# Patient Record
Sex: FEMALE | Race: WHITE | NOT HISPANIC OR LATINO | Employment: STUDENT | URBAN - METROPOLITAN AREA
[De-identification: names, ages, dates, MRNs, and addresses within clinical notes are randomized per-mention and may not be internally consistent; named-entity substitution may affect disease eponyms.]

---

## 2019-10-23 ENCOUNTER — OFFICE VISIT (OUTPATIENT)
Dept: DERMATOLOGY | Facility: CLINIC | Age: 19
End: 2019-10-23
Payer: COMMERCIAL

## 2019-10-23 VITALS — SYSTOLIC BLOOD PRESSURE: 101 MMHG | DIASTOLIC BLOOD PRESSURE: 61 MMHG | HEART RATE: 74 BPM

## 2019-10-23 DIAGNOSIS — L70.0 ACNE VULGARIS: Primary | ICD-10-CM

## 2019-10-23 RX ORDER — CLINDAMYCIN PHOSPHATE 10 UG/ML
LOTION TOPICAL 2 TIMES DAILY
Qty: 60 ML | Refills: 3 | Status: SHIPPED | OUTPATIENT
Start: 2019-10-23 | End: 2020-07-13

## 2019-10-23 RX ORDER — TRETINOIN 0.25 MG/G
CREAM TOPICAL AT BEDTIME
Qty: 45 G | Refills: 3 | Status: SHIPPED | OUTPATIENT
Start: 2019-10-23 | End: 2020-07-13 | Stop reason: ALTCHOICE

## 2019-10-23 RX ORDER — CLINDAMYCIN PHOSPHATE 10 UG/ML
LOTION TOPICAL 2 TIMES DAILY
COMMUNITY
End: 2020-07-13 | Stop reason: ALTCHOICE

## 2019-10-23 ASSESSMENT — PAIN SCALES - GENERAL: PAINLEVEL: NO PAIN (0)

## 2019-10-23 NOTE — LETTER
Date:October 25, 2019      Patient was self referred, no letter generated. Do not send.        [facility] Health Information

## 2019-10-23 NOTE — LETTER
10/23/2019       RE: Stacey Rod  [address]  [place] [address]     Dear Colleague,    Thank you for referring your patient, Stacey Rod, to the [facility] DERMATOLOGY at [facility]. Please see a copy of my visit note below.    [facility] Dermatology Note      Dermatology Problem List:  1. Acne Vulgaris   -clindamycin 1% lotion, tretinoin 0.025% cream, sulfur/sulfacetamide wash    Encounter Date: Oct 23, 2019    CC:  Chief Complaint   Patient presents with     Derm Problem     Acne, Stacey is only using clindamycin lotion. Stacey needs to establish care.          History of Present Illness:  Ms. Stacey Rod is a 18 year old female who presents for evaluation of acne on the face, chest, and back. The patient reports that she has been dealing with acne since she was about [age]. She initially took minocycline and used tretinoin and benzoyl peroxide wash. This regimen provided relief for about a year, but the acne then became worse, and she discontinued the regimen because it was not providing relief. About a month ago, she started using clindamycin and sulfur/sulfacetamide topically. She states that these have been controlling her acne relatively well, though there has been some continued involvement on her face and upper chest. Of note, she found that her acne improved after she had her IUD placed. The patient voices no other concerns.      Past Medical History:   Patient Active Problem List   Diagnosis     Acne vulgaris     History reviewed. No pertinent past medical history.  History reviewed. No pertinent surgical history.    Social History:  Patient reports that she has quit smoking. She smoked 0.00 packs per day. She has never used smokeless tobacco.    Family History:  Family History   Problem Relation Age of Onset     Melanoma No family hx of      Skin Cancer No family hx of        Medications:  Current Outpatient  Medications   Medication Sig Dispense Refill     clindamycin (CLEOCIN T) 1 % external lotion Apply topically 2 times daily       levonorgestrel (EWA) 13.5 MG IUD 1 each by Intrauterine route once         No Known Allergies    Review of Systems:  -Constitutional: Otherwise feeling well today, in usual state of health.  -HEENT: Patient denies nonhealing oral sores.  -Skin: As above in HPI. No additional skin concerns.    Physical exam:  Vitals: /61   Pulse 74   GEN: This is a well developed, well-nourished female in no acute distress, in a pleasant mood.    SKIN: Acne exam, which includes the face, neck, upper central chest, shoulders, and upper central back was performed.  -Alas skin type: III   -There are superifical acneiform papules with intermixed open and closed comedones and superficial scarring on the face, upper chest, shoulders, and upper back.   -No other lesions of concern on areas examined.       Impression/Plan:  1. Acne Vulgaris: primarily comedonal on the face, upper chest, shoulders, and upper back    We discussed the natural etiology of the condition as well as the risks, benefits, and efficacy of treatment with topicals     Restart tretinoin 0.025% cream at bedtime    Continue: clindamycin 1% lotion as previously prescribed and sulfur sulfacetamide wash      Follow-up in 3 months, earlier for new or changing lesions.       Staff Involved:  Scribe/Staff    Scribe Disclosure  I, Dominic Najjar, am serving as a scribe to document services personally performed by Dr. Kush Fang MD, based on data collection and the provider's statements to me.    Provider Disclosure:   The documentation recorded by the scribe accurately reflects the services I personally performed and the decisions made by me.    Kush Fang MD   of Dermatology  Department of Dermatology  [facility]           Again, thank you for allowing me to participate in  the care of your patient.      Sincerely,    Kush Fang MD

## 2019-10-23 NOTE — PROGRESS NOTES
[facility] Dermatology Note      Dermatology Problem List:  1. Acne Vulgaris   -clindamycin 1% lotion, tretinoin 0.025% cream, sulfur/sulfacetamide wash    Encounter Date: Oct 23, 2019    CC:  Chief Complaint   Patient presents with     Derm Problem     Acne, Stacey is only using clindamycin lotion. Stacey needs to establish care.          History of Present Illness:  Ms. Stacey Rod is a 18 year old female who presents for evaluation of acne on the face, chest, and back. The patient reports that she has been dealing with acne since she was about [age]. She initially took minocycline and used tretinoin and benzoyl peroxide wash. This regimen provided relief for about a year, but the acne then became worse, and she discontinued the regimen because it was not providing relief. About a month ago, she started using clindamycin and sulfur/sulfacetamide topically. She states that these have been controlling her acne relatively well, though there has been some continued involvement on her face and upper chest. Of note, she found that her acne improved after she had her IUD placed. The patient voices no other concerns.      Past Medical History:   Patient Active Problem List   Diagnosis     Acne vulgaris     History reviewed. No pertinent past medical history.  History reviewed. No pertinent surgical history.    Social History:  Patient reports that she has quit smoking. She smoked 0.00 packs per day. She has never used smokeless tobacco.    Family History:  Family History   Problem Relation Age of Onset     Melanoma No family hx of      Skin Cancer No family hx of        Medications:  Current Outpatient Medications   Medication Sig Dispense Refill     clindamycin (CLEOCIN T) 1 % external lotion Apply topically 2 times daily       levonorgestrel (EWA) 13.5 MG IUD 1 each by Intrauterine route once         No Known Allergies    Review of Systems:  -Constitutional: Otherwise feeling well today,  in usual state of health.  -HEENT: Patient denies nonhealing oral sores.  -Skin: As above in HPI. No additional skin concerns.    Physical exam:  Vitals: /61   Pulse 74   GEN: This is a well developed, well-nourished female in no acute distress, in a pleasant mood.    SKIN: Acne exam, which includes the face, neck, upper central chest, shoulders, and upper central back was performed.  -Alas skin type: III   -There are superifical acneiform papules with intermixed open and closed comedones and superficial scarring on the face, upper chest, shoulders, and upper back.   -No other lesions of concern on areas examined.       Impression/Plan:  1. Acne Vulgaris: primarily comedonal on the face, upper chest, shoulders, and upper back    We discussed the natural etiology of the condition as well as the risks, benefits, and efficacy of treatment with topicals     Restart tretinoin 0.025% cream at bedtime    Continue: clindamycin 1% lotion as previously prescribed and sulfur sulfacetamide wash      Follow-up in 3 months, earlier for new or changing lesions.       Staff Involved:  Scribe/Staff    Scribe Disclosure  I, Dominic Najjar, am serving as a scribe to document services personally performed by Dr. Kush Fang MD, based on data collection and the provider's statements to me.    Provider Disclosure:   The documentation recorded by the scribe accurately reflects the services I personally performed and the decisions made by me.    Kush Fang MD   of Dermatology  Department of Dermatology  [facility]

## 2019-10-23 NOTE — NURSING NOTE
Dermatology Rooming Note    Stacey Rod's goals for this visit include:   Chief Complaint   Patient presents with     Derm Problem     Acne, Stacey is only using clindamycin lotion. Stacey needs to establish care.      Katy Capellan LPN

## 2020-03-06 ENCOUNTER — OFFICE VISIT (OUTPATIENT)
Dept: DERMATOLOGY | Facility: CLINIC | Age: 20
End: 2020-03-06
Payer: COMMERCIAL

## 2020-03-06 VITALS — DIASTOLIC BLOOD PRESSURE: 47 MMHG | SYSTOLIC BLOOD PRESSURE: 106 MMHG | HEART RATE: 49 BPM

## 2020-03-06 DIAGNOSIS — L30.2 AUTOECZEMATIZATION: ICD-10-CM

## 2020-03-06 DIAGNOSIS — B35.4 TINEA CORPORIS: ICD-10-CM

## 2020-03-06 DIAGNOSIS — L70.0 ACNE VULGARIS: Primary | ICD-10-CM

## 2020-03-06 RX ORDER — PRENATAL VIT 91/IRON/FOLIC/DHA 28-975-200
COMBINATION PACKAGE (EA) ORAL 2 TIMES DAILY
Qty: 42 G | Refills: 4 | Status: CANCELLED | OUTPATIENT
Start: 2020-03-06

## 2020-03-06 RX ORDER — TRIAMCINOLONE ACETONIDE 1 MG/G
CREAM TOPICAL
COMMUNITY
Start: 2020-03-02 | End: 2020-07-13 | Stop reason: ALTCHOICE

## 2020-03-06 RX ORDER — TRIAMCINOLONE ACETONIDE 1 MG/G
OINTMENT TOPICAL 2 TIMES DAILY
Qty: 80 G | Refills: 5 | Status: SHIPPED | OUTPATIENT
Start: 2020-03-06

## 2020-03-06 RX ORDER — TRETINOIN 0.5 MG/G
CREAM TOPICAL
Qty: 45 G | Refills: 5 | Status: SHIPPED | OUTPATIENT
Start: 2020-03-06

## 2020-03-06 ASSESSMENT — PAIN SCALES - GENERAL: PAINLEVEL: NO PAIN (0)

## 2020-03-06 NOTE — NURSING NOTE
"Dermatology Rooming Note    Stacey Rod's goals for this visit include:   Chief Complaint   Patient presents with     Derm Problem     Acne f/u - Stacey states, I broke out on Monday but its clearing up now. I am also getting a lot of red spots\"     Sharon Okeefe, EMT    "

## 2020-03-06 NOTE — LETTER
3/6/2020       RE: Stacey Rod  [address]  [place] [address]     Dear Colleague,    Thank you for referring your patient, Stacey Rod, to the [facility] DERMATOLOGY at [facility]. Please see a copy of my visit note below.    [facility] Dermatology Note    Dermatology Problem List:  1. Acne vulgaris  -Tretinoin 0.05% cream nightly  -Clindamycin lotion daily  2. 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 Macular pigmentary birthmark on the left shoulder    Encounter Date: Mar 6, 2020    CC: acne and rash    History of Present Illness:  Ms. Stacey Rod is a 19 year old female presenting for acne and rash  Acne has been improving overall, but still gets bumps every now and then  Tretinoin causes mild irritation but not severe  Patient states that she developed an acne flare about 1 week ago  Using sulfacetamide wash only on the body as it is drying  States that she fell on a bonfire a few weeks ago and got a skin eruption on the right buttock, then started getting red bumps and a ring-worm like eruption on the right anterior shin; was seen at [facility], KOH negative, was prescribed ketoconazole cream and shampoo which she used for a few weeks but the bumps never went away and started getting more bump elsewhere on the body that were itchy  Was told that she also had tinea versicolor versus a birthmark on the left shoulder, ketoconazole shampoo did not help with the appearance, not symptomatic at all  Started using cortisone cream on the eruption which has been helping with the itch    Past Medical, Social, Family History:   Patient Active Problem List   Diagnosis     Acne vulgaris     No past medical history on file.  No past surgical history on file.    Family History   Problem Relation Age of Onset     Melanoma No family hx of      Skin Cancer No family hx of        Social History  Studying environmental  sciences at [employer]  Patient  reports that she has quit smoking. She smoked 0.00 packs per day. She has never used smokeless tobacco.    Current Outpatient Medications   Medication Sig Dispense Refill     clindamycin (CLEOCIN T) 1 % external lotion Apply topically 2 times daily       clindamycin (CLEOCIN T) 1 % external lotion Apply topically 2 times daily 60 mL 3     levonorgestrel (EWA) 13.5 MG IUD 1 each by Intrauterine route once       tretinoin (RETIN-A) 0.025 % external cream Apply topically At Bedtime Pea-sized amount to whole face 45 g 3     tretinoin (RETIN-A) 0.05 % external cream Use nightly for acne as tolerated 45 g 5     triamcinolone (KENALOG) 0.1 % external cream APPLY TO AFFECTED AREA TWICE DAILY; MAX 2 WEEKS AT A TIME       triamcinolone (KENALOG) 0.1 % external ointment Apply topically 2 times daily 80 g 5        Allergies  No Known Allergies    Review of Systems:  Constitutional: Otherwise feeling well today, in usual state of health.  HEENT: Patient denies nonhealing oral sores.    Physical exam:  Vitals: /47 (BP Location: Right arm, Patient Position: Sitting, Cuff Size: Adult Regular)   Pulse (!) 49   General: In no acute distress, well-developed, well-nourished  Eyes: Conjunctivae clear  Pulmonary: Breathing comfortably in no distress  CV: Well-perfused, no cyanosis  Extremities: No deformity, no edema    Skin:   Alas II  -Face  -Neck  -Chest  -Abdomen  -Back  -Bilateral upper extremities  -Bilateral lower extremities  -Buttocks      -Rare superifical acneiform inflammatory papules and pustules with intermixed open and closed comedones on the face  -Annular pink scaly plaques on the right anterior shin  -Eczematous papules on the right buttock and left medial thigh  -Brown speckled reticulated patch on the left shoulder                      Impression/Plan:    Acne vulgaris, comedonal and inflammatory, mild  -Photos taken for documentation  -increase Tretinoin 0.05% cream every  other day; counseled on side effects of xerosis and irritation; recommended to start every other night, then increase to nightly as tolerated; follow with non-comedogenic moisturizer  -Clindamycin lotion to face, chest, back in the morning; can continue sulfur/sulfacetamide    2. Eczematous dermatitis with autoeczematization  Annular skin eruption with diffuse eczematous papules, KOH negative; suspect eczematous eruption, cannot rule out tinea corporis as it may have been partially treated   -continue triamcinolone 0.1% cream BID PRN    3. Macular pigmentary birthmark on the left shoulder  Likely more prominent with sun exposure  -Lesion benign nature, no treatment is indicated at this time, will monitor for any clinical changes    Follow-up in 3 months for acne    Faculty: Dr. Fang    Staff Involved:  Resident/Staff    Manasa Pinedo MD  Dermatology Resident  [facility]    Staff Physician Comments:   I saw and evaluated the patient with the resident and I edited the assessment and plan as documented in the note. I was present for the examination.    Kush Fang MD   of Dermatology  Department of Dermatology  [facility] [employer]

## 2020-03-06 NOTE — PATIENT INSTRUCTIONS
Use trimacinolone ointmnet on the rash twice daily until resolved  Tretinoin 0.05% cream nightly on the fade  Continue clindamycin lotion on the face  Sulfacetamide wash on the body  If the rash doesn't get better in 2 days, let us know

## 2020-03-06 NOTE — PROGRESS NOTES
[facility] Dermatology Note    Dermatology Problem List:  1. Acne vulgaris  -Tretinoin 0.05% cream nightly  -Clindamycin lotion daily  2. 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 Macular pigmentary birthmark on the left shoulder    Encounter Date: Mar 6, 2020    CC: acne and rash    History of Present Illness:  Ms. Stacey Rod is a 19 year old female presenting for acne and rash  Acne has been improving overall, but still gets bumps every now and then  Tretinoin causes mild irritation but not severe  Patient states that she developed an acne flare about 1 week ago  Using sulfacetamide wash only on the body as it is drying  States that she fell on a bonfire a few weeks ago and got a skin eruption on the right buttock, then started getting red bumps and a ring-worm like eruption on the right anterior shin; was seen at [facility], KOH negative, was prescribed ketoconazole cream and shampoo which she used for a few weeks but the bumps never went away and started getting more bump elsewhere on the body that were itchy  Was told that she also had tinea versicolor versus a birthmark on the left shoulder, ketoconazole shampoo did not help with the appearance, not symptomatic at all  Started using cortisone cream on the eruption which has been helping with the itch    Past Medical, Social, Family History:   Patient Active Problem List   Diagnosis     Acne vulgaris     No past medical history on file.  No past surgical history on file.    Family History   Problem Relation Age of Onset     Melanoma No family hx of      Skin Cancer No family hx of        Social History  Studying environmental sciences at [employer]  Patient  reports that she has quit smoking. She smoked 0.00 packs per day. She has never used smokeless tobacco.    Current Outpatient Medications   Medication Sig Dispense Refill     clindamycin (CLEOCIN T) 1 % external lotion Apply topically 2 times daily        clindamycin (CLEOCIN T) 1 % external lotion Apply topically 2 times daily 60 mL 3     levonorgestrel (EWA) 13.5 MG IUD 1 each by Intrauterine route once       tretinoin (RETIN-A) 0.025 % external cream Apply topically At Bedtime Pea-sized amount to whole face 45 g 3     tretinoin (RETIN-A) 0.05 % external cream Use nightly for acne as tolerated 45 g 5     triamcinolone (KENALOG) 0.1 % external cream APPLY TO AFFECTED AREA TWICE DAILY; MAX 2 WEEKS AT A TIME       triamcinolone (KENALOG) 0.1 % external ointment Apply topically 2 times daily 80 g 5        Allergies  No Known Allergies    Review of Systems:  Constitutional: Otherwise feeling well today, in usual state of health.  HEENT: Patient denies nonhealing oral sores.    Physical exam:  Vitals: /47 (BP Location: Right arm, Patient Position: Sitting, Cuff Size: Adult Regular)   Pulse (!) 49   General: In no acute distress, well-developed, well-nourished  Eyes: Conjunctivae clear  Pulmonary: Breathing comfortably in no distress  CV: Well-perfused, no cyanosis  Extremities: No deformity, no edema    Skin:   Alas II  -Face  -Neck  -Chest  -Abdomen  -Back  -Bilateral upper extremities  -Bilateral lower extremities  -Buttocks      -Rare superifical acneiform inflammatory papules and pustules with intermixed open and closed comedones on the face  -Annular pink scaly plaques on the right anterior shin  -Eczematous papules on the right buttock and left medial thigh  -Brown speckled reticulated patch on the left shoulder                      Impression/Plan:    Acne vulgaris, comedonal and inflammatory, mild  -Photos taken for documentation  -increase Tretinoin 0.05% cream every other day; counseled on side effects of xerosis and irritation; recommended to start every other night, then increase to nightly as tolerated; follow with non-comedogenic moisturizer  -Clindamycin lotion to face, chest, back in the morning; can continue sulfur/sulfacetamide    2.  Eczematous dermatitis with autoeczematization  Annular skin eruption with diffuse eczematous papules, KOH negative; suspect eczematous eruption, cannot rule out tinea corporis as it may have been partially treated   -continue triamcinolone 0.1% cream BID PRN    3. Macular pigmentary birthmark on the left shoulder  Likely more prominent with sun exposure  -Lesion benign nature, no treatment is indicated at this time, will monitor for any clinical changes    Follow-up in 3 months for acne    Faculty: Dr. Fang    Staff Involved:  Resident/Staff    Manasa Pinedo MD  Dermatology Resident  [facility]    Staff Physician Comments:   I saw and evaluated the patient with the resident and I edited the assessment and plan as documented in the note. I was present for the examination.    Kush Fang MD   of Dermatology  Department of Dermatology  [facility] [employer] [facility]

## 2020-05-20 ENCOUNTER — TRANSFERRED RECORDS (OUTPATIENT)
Dept: HEALTH INFORMATION MANAGEMENT | Facility: CLINIC | Age: 20
End: 2020-05-20

## 2020-07-13 ENCOUNTER — VIRTUAL VISIT (OUTPATIENT)
Dept: DERMATOLOGY | Facility: CLINIC | Age: 20
End: 2020-07-13
Payer: COMMERCIAL

## 2020-07-13 DIAGNOSIS — L70.0 ACNE VULGARIS: Primary | ICD-10-CM

## 2020-07-13 DIAGNOSIS — R23.8 DRY SCALP: ICD-10-CM

## 2020-07-13 RX ORDER — CLINDAMYCIN PHOSPHATE 10 UG/ML
LOTION TOPICAL DAILY
Qty: 60 ML | Refills: 4 | Status: SHIPPED | OUTPATIENT
Start: 2020-07-13

## 2020-07-13 RX ORDER — ESCITALOPRAM OXALATE 20 MG/1
20 TABLET ORAL DAILY
COMMUNITY

## 2020-07-13 ASSESSMENT — PAIN SCALES - GENERAL: PAINLEVEL: NO PAIN (0)

## 2020-07-13 NOTE — NURSING NOTE
Dermatology Rooming Note    Stacey Rod's goals for this visit include:   Chief Complaint   Patient presents with     Derm Problem     Acne vulgaris - Stacey states she has been stable. She had a few breakouts from not keeping up with her skin care routine from traveling     [facility]

## 2020-07-13 NOTE — LETTER
7/13/2020       RE: Stacey Rod  [address]  [place] [address]     Dear Colleague,    Thank you for referring your patient, Stacey Rod, to the [facility] DERMATOLOGY at [facility]. Please see a copy of my visit note below.    [facility]  [facility]      Impression and Recommendations (Patient Counseled on the Following):  1. Acne vulgaris: stable on current regimen; had recent flare with less consistent topical use, now using more consistently. Will increase frequency of tretinoin to nightly and continue rest of current regimen. We did discuss increasing to tretinoin 0.1% cream at bedtime or adding spironolactone but patient defers at this time.  -Tretinoin 0.05% cream at bedtime  -Clindamycin lotion QAM  -Sulfur/sulfacetamide wash daily to trunk    2. Seborrheic dermatitis vs scalp xerosis: start OTC zinc pyrithione shampoo. If refractory start coal tar shampoo. If remains refractory, consider fluocinolone oil at follow up      Follow-up:   2-3 months     Staff only:    Kush Fang MD   of Dermatology  Department of Dermatology  [facility]      _____________________________________________________________________________    Dermatology Problem List:  1. Acne vulgaris  -Tretinoin 0.05% cream nightly  -Clindamycin lotion daily  -Sulfur/sulfacetamide wash  2. 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 Macular pigmentary birthmark on the left shoulder  4. Mild seborrheic dermatitis vs xerosis of scalp: OTC zinc pyrithione shampoo    Encounter Date: Jul 13, 2020    CC:   Chief Complaint   Patient presents with     Derm Problem     Acne vulgaris - Stacey states she has been stable. She had a few breakouts from not keeping up with her skin care routine from traveling       History of Present Illness:  I have reviewed the teledermatology information and  the nursing intake corresponding to this issue. Stacey Rod is a 19 year old female who presents via teledermatology for acne.    Acne - overall going well   - a few breakouts on the face and chest   - back in routine the last week or so   - using tretinoin 0.05% cream at bedtime and clindamycin lotion in the morning   - benzoyl peroxide on the chest and back    Scalp has been very dry -     ROS:   General: no fevers, chills, or weight loss  Skin: as per HPI    Physical Examination:  General: Well-appearing, appropriately-developed individual. Alert and oriented in a pleasant mood.  Skin: Focused examination within the teledermatology video including face, chest was performed.   -erythematous papules and comedones on the jawline > forehead and chest    Past Medical History:   Patient Active Problem List   Diagnosis     Acne vulgaris     No past medical history on file.  No past surgical history on file.    Social History:  Patient reports that she has quit smoking. She smoked 0.00 packs per day. She has never used smokeless tobacco.    Family History:  Family History   Problem Relation Age of Onset     Melanoma No family hx of      Skin Cancer No family hx of        Medications:  Current Outpatient Medications   Medication     clindamycin (CLEOCIN T) 1 % external lotion     clindamycin (CLEOCIN T) 1 % external lotion     escitalopram (LEXAPRO) 20 MG tablet     levonorgestrel (EWA) 13.5 MG IUD     tretinoin (RETIN-A) 0.025 % external cream     tretinoin (RETIN-A) 0.05 % external cream     triamcinolone (KENALOG) 0.1 % external cream     triamcinolone (KENALOG) 0.1 % external ointment     No current facility-administered medications for this visit.           No Known Allergies      _____________________________________________________________________________    Teledermatology information:  - Location of patient: [place]  - Patient presented as: return  - Location of teledermatologist:  [facility] DERMATOLOGY  )  - Reason teledermatology is appropriate:  of National Emergency Regarding Coronavirus disease (COVID 19) Outbreak  - Image quality and interpretability: n/a  - Physician has received verbal consent for a Video/Photos Visit from the patient? Yes  - In-person dermatology visit recommendation: no  - Date of images: n/a  Following Video failure, the remainder of the visit was conducted by Telephone.  Video start time: 12:13  Video failure time: 12:20  Telephone start time: 12:21  Telephone end time: 12:28  - Date of report: 7/13/2020     Again, thank you for allowing me to participate in the care of your patient.      Sincerely,    Kush Fang MD

## 2020-07-13 NOTE — PROGRESS NOTES
[facility]      Impression and Recommendations (Patient Counseled on the Following):  1. Acne vulgaris: stable on current regimen; had recent flare with less consistent topical use, now using more consistently. Will increase frequency of tretinoin to nightly and continue rest of current regimen. We did discuss increasing to tretinoin 0.1% cream at bedtime or adding spironolactone but patient defers at this time.  -Tretinoin 0.05% cream at bedtime  -Clindamycin lotion QAM  -Sulfur/sulfacetamide wash daily to trunk    2. Seborrheic dermatitis vs scalp xerosis: start OTC zinc pyrithione shampoo. If refractory start coal tar shampoo. If remains refractory, consider fluocinolone oil at follow up      Follow-up:   2-3 months     Staff only:    Kush Fang MD   of Dermatology  Department of Dermatology  [facility]      _____________________________________________________________________________    Dermatology Problem List:  1. Acne vulgaris  -Tretinoin 0.05% cream nightly  -Clindamycin lotion daily  -Sulfur/sulfacetamide wash  2. 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 Macular pigmentary birthmark on the left shoulder  4. Mild seborrheic dermatitis vs xerosis of scalp: OTC zinc pyrithione shampoo    Encounter Date: Jul 13, 2020    CC:   Chief Complaint   Patient presents with     Derm Problem     Acne vulgaris - Stacey states she has been stable. She had a few breakouts from not keeping up with her skin care routine from traveling       History of Present Illness:  I have reviewed the teledermatology information and the nursing intake corresponding to this issue. Stacey Rod is a 19 year old female who presents via teledermatology for acne.    Acne - overall going well   - a few breakouts on the face and chest   - back in routine the last week or so   - using tretinoin 0.05% cream at  bedtime and clindamycin lotion in the morning   - benzoyl peroxide on the chest and back    Scalp has been very dry -     ROS:   General: no fevers, chills, or weight loss  Skin: as per HPI    Physical Examination:  General: Well-appearing, appropriately-developed individual. Alert and oriented in a pleasant mood.  Skin: Focused examination within the teledermatology video including face, chest was performed.   -erythematous papules and comedones on the jawline > forehead and chest    Past Medical History:   Patient Active Problem List   Diagnosis     Acne vulgaris     No past medical history on file.  No past surgical history on file.    Social History:  Patient reports that she has quit smoking. She smoked 0.00 packs per day. She has never used smokeless tobacco.    Family History:  Family History   Problem Relation Age of Onset     Melanoma No family hx of      Skin Cancer No family hx of        Medications:  Current Outpatient Medications   Medication     clindamycin (CLEOCIN T) 1 % external lotion     clindamycin (CLEOCIN T) 1 % external lotion     escitalopram (LEXAPRO) 20 MG tablet     levonorgestrel (EWA) 13.5 MG IUD     tretinoin (RETIN-A) 0.025 % external cream     tretinoin (RETIN-A) 0.05 % external cream     triamcinolone (KENALOG) 0.1 % external cream     triamcinolone (KENALOG) 0.1 % external ointment     No current facility-administered medications for this visit.           No Known Allergies      _____________________________________________________________________________    Teledermatology information:  - Location of patient: [place]  - Patient presented as: return  - Location of teledermatologist:  ([facility] DERMAT[facility] )  - Reason teledermatology is appropriate:  of National Emergency Regarding Coronavirus disease (COVID 19) Outbreak  - Image quality and interpretability: n/a  - Physician has received verbal consent for a Video/Photos Visit from the patient? Yes  - In-person dermatology visit  recommendation: no  - Date of images: n/a  Following Video failure, the remainder of the visit was conducted by Telephone.  Video start time: 12:13  Video failure time: 12:20  Telephone start time: 12:21  Telephone end time: 12:28  - Date of report: 7/13/2020

## 2020-07-13 NOTE — LETTER
Date:July 14, 2020      Patient was self referred, no letter generated. Do not send.        [facility] Physicians Health Information

## 2020-07-13 NOTE — PATIENT INSTRUCTIONS
[facility] Teledermatology Visit    Thank you for allowing us to participate in your care. Your findings, instructions and follow-up plan are as follows:    For dry scalp - start with zinc pyrithione shampoo (Head&Shoulders) 2-3 times per week. If this doesn't work, switch to     When should I call my doctor?    If you are worsening or not improving, please, contact us or seek urgent care as noted below.     Who should I call with questions (adults)?    [facility] (adult and pediatric): [phone]     [facility] (adult): [phone]    For urgent needs outside of business hours call the [facility] at [phone] and ask for the dermatology resident on call    If this is a medical emergency and you are unable to reach an ER, Call [phone]      Who should I call with questions (pediatric)?  [facility]- Pediatric Dermatology  Dr. Aicha Whittaker, Dr. Jonathan Vargas, Dr. Noemi Bernal, Viviana Sharma, LESTER Brown, Dr. Gin Puentes & Dr. Kush Patel  Non Urgent  Nurse Triage Line; [phone]- Rachael and Mara RN Care Coordinators   Evelin (/Complex ) [phone]    If you need a prescription refill, please contact your pharmacy. Refills are approved or denied by our Physicians during normal business hours, Monday through Fridays  Per office policy, refills will not be granted if you have not been seen within the past year (or sooner depending on your child's condition)    Scheduling Information:  Pediatric Appointment Scheduling and Call Center [phone]  Radiology Scheduling- [phone]  Sedation Unit Scheduling- [phone]  [place] Scheduling- General [phone]; Pediatric Dermatology [phone]  Main  Services: [phone]  Congolese: [phone]  Stateless: [phone]  Hmong/Koko/Jorge: [phone]  Preadmission Nursing Department  Fax Number: [phone] (Fax all pre-operative paperwork to this number)    For urgent matters arising during evenings, weekends, or holidays that cannot wait for normal business hours please call [phone] and ask for the Dermatology Resident On-Call to be paged.

## 2020-07-17 ENCOUNTER — MEDICAL CORRESPONDENCE (OUTPATIENT)
Dept: HEALTH INFORMATION MANAGEMENT | Facility: CLINIC | Age: 20
End: 2020-07-17

## 2020-08-06 NOTE — TELEPHONE ENCOUNTER
"FUTURE VISIT INFORMATION      FUTURE VISIT INFORMATION:    Date: 8/14/2020    Time: 10:30AM    Location: [facility]  REFERRAL INFORMATION:    Referring provider:  Dr. Siri Rivero    Referring providers clinic:  [facility] in [place], [place]    Reason for visit/diagnosis  Difficulty breathing through nose; hx sinus infections and surgery (last in 2014 or 2015, per pt). Pt preferred in person visit. Referred per Dr. Siri Rivero with [facility] in [place], [place]. Per pt, recs faxed to [phone] on 7/17.    RECORDS REQUESTED FROM:       Clinic name Comments Records Status Imaging Status   [facility] in [place]  [address]  [place] [address]  Ph. [phone]  Fx. [phone] 5/20/2020, 5/5/2020 note from Dr. Siri Rivero  8/20/2019 note from Ana BATISTA    9/10/15 CT Sinus \"Imaging was performed prior to FESS (BEMA w/ tiss, BETE, BES w/tiss, BEF w/tiss, NSR, BSMRT\"    *image tracking : [id number]   Sent to scan 8/6/2020 req 8/6/2020 - received 8/11/2020    ORAL SURGERY [place] [address] [place] [address]    [phone]   01/14/2019 note from Neftaly Evans MD   Care Everywhere     [facility]  [address] [place] [address] [phone]   11/24/2015 PANSINUSOTOMY with Alonso Yin MD   Care Everywhere                         8/6/2020 10:04AM sent a fax to [facility] for any imaging reports - amay   *receieved imaging report from [facility] and sent to scan, sent a fax to mail out images - Amay   8/11/2020 10:36AM images from [facility]. Received and sent to [facility] upload station. Per [facility] \"Imaging was performed prior to FESS (BEMA w/ Tiss, BETE, BES w/tiss, BEF w/Tiss, NSR, BSMRT\" written on the fax - amay   "

## 2020-08-14 ENCOUNTER — OFFICE VISIT (OUTPATIENT)
Dept: OTOLARYNGOLOGY | Facility: CLINIC | Age: 20
End: 2020-08-14
Payer: COMMERCIAL

## 2020-08-14 ENCOUNTER — TELEPHONE (OUTPATIENT)
Dept: OTOLARYNGOLOGY | Facility: CLINIC | Age: 20
End: 2020-08-14

## 2020-08-14 ENCOUNTER — PRE VISIT (OUTPATIENT)
Dept: OTOLARYNGOLOGY | Facility: CLINIC | Age: 20
End: 2020-08-14

## 2020-08-14 VITALS — WEIGHT: 125 LBS | OXYGEN SATURATION: 99 % | TEMPERATURE: 98.1 F | HEART RATE: 61 BPM

## 2020-08-14 DIAGNOSIS — J30.1 SEASONAL ALLERGIC RHINITIS DUE TO POLLEN: ICD-10-CM

## 2020-08-14 DIAGNOSIS — R06.89 DIFFICULTY BREATHING: Primary | ICD-10-CM

## 2020-08-14 DIAGNOSIS — J32.9 CHRONIC SINUSITIS, UNSPECIFIED LOCATION: ICD-10-CM

## 2020-08-14 DIAGNOSIS — J38.3 VOCAL CORD DYSFUNCTION: ICD-10-CM

## 2020-08-14 PROBLEM — J30.2 SEASONAL ALLERGIC RHINITIS: Status: ACTIVE | Noted: 2020-08-14

## 2020-08-14 RX ORDER — MISOPROSTOL 200 UG/1
TABLET ORAL
COMMUNITY
Start: 2020-08-13

## 2020-08-14 RX ORDER — DROSPIRENONE AND ETHINYL ESTRADIOL 0.03MG-3MG
KIT ORAL
COMMUNITY
Start: 2020-08-06

## 2020-08-14 RX ORDER — VALACYCLOVIR HYDROCHLORIDE 500 MG/1
TABLET, FILM COATED ORAL
COMMUNITY
Start: 2020-07-22

## 2020-08-14 RX ORDER — FLUTICASONE PROPIONATE 50 MCG
2 SPRAY, SUSPENSION (ML) NASAL 2 TIMES DAILY
Qty: 16 G | Refills: 11 | Status: SHIPPED | OUTPATIENT
Start: 2020-08-14

## 2020-08-14 ASSESSMENT — PAIN SCALES - GENERAL: PAINLEVEL: NO PAIN (0)

## 2020-08-14 NOTE — LETTER
8/14/2020       RE: Stacey Rod  [address]  [place] [address]     Dear Colleague,    Thank you for referring your patient, Stacey Rod, to the [facility] EAR NO[facility]AT at [facility] Please see a copy of my visit note below.      [facility]  New Patient Visit      Encounter date: August 14, 2020      Chief Complaint: chronic sinusitis; nasal congestion    History of Present Illness: Stacey Rod is a 19-year-old woman who I am seeing for difficulty breathing through the nose.  She does have a history of chronic sinusitis with nasal polyposis.  She underwent endoscopic sinus surgery in [facility] approximately 5 years ago.  This helped with symptoms of facial pain, pressure, and nasal drainage.  She complains today of persistent difficulty breathing through the nose and also through the mouth.  She has constant feelings of shortness of breath.  She does show some insight that she feels like there is some of this that may be related to anxiety and she is seeing a therapist for this.  She has seen speech therapy in the past for possible vocal cord dysfunction, but this was only a virtual visit.  She is interested in having referral here locally.  There is question of some component of seasonal allergic rhinitis which may be contributing to her nasal congestion.  She denies significant sneezing fits, itchy eyes, clear rhinorrhea.      Review of systems: A 14-point review of systems has been conducted and was negative for any notable symptoms, except as dictated in the history of present illness.     PMH: CRSwNP    PSH: ESS in 2015      Family History   Problem Relation Age of Onset     Melanoma No family hx of      Skin Cancer No family hx of         Social History     Socioeconomic History     Marital status: Single     Spouse name: Not on file     Number of children: Not on file     Years of education: Not on file     Highest  education level: Not on file   Occupational History     Not on file   Social Needs     Financial resource strain: Not on file     Food insecurity     Worry: Not on file     Inability: Not on file     Transportation needs     Medical: Not on file     Non-medical: Not on file   Tobacco Use     Smoking status: Former Smoker     Packs/day: 0.00     Smokeless tobacco: Never Used   Substance and Sexual Activity     Alcohol use: Not on file     Drug use: Not on file     Sexual activity: Not on file   Lifestyle     Physical activity     Days per week: Not on file     Minutes per session: Not on file     Stress: Not on file   Relationships     Social connections     Talks on phone: Not on file     Gets together: Not on file     Attends Restorationism service: Not on file     Active member of club or organization: Not on file     Attends meetings of clubs or organizations: Not on file     Relationship status: Not on file     Intimate partner violence     Fear of current or ex partner: Not on file     Emotionally abused: Not on file     Physically abused: Not on file     Forced sexual activity: Not on file   Other Topics Concern     Parent/sibling w/ CABG, MI or angioplasty before 65F 55M? Not Asked   Social History Narrative     Not on file        Physical Exam:  Vital signs: There were no vitals taken for this visit.   General Appearance: No acute distress, appropriate demeanor, conversant  Eyes: moist conjunctivae; EOMI; pupils symmetric; visual acuity grossly intact; no proptosis  Head: normocephalic; overall symmetric appearance without deformity  Face: overall symmetric without deformity; HB I/VI  Ears: Normal appearance of external ear; external meatus normal in appearance; TMs intact without perforation bilaterally;   Nose: No external deformity; septum midline; inferior turbinates without significant hypertrophy  Oral Cavity/oropharynx: Normal appearance of mucosa; tongue midline; no mass or lesions; oropharynx without  obvious mucosal abnormality  Neck: no palpable lymphadenopathy; thyroid without palpable nodules  Lungs: symmetric chest rise; no wheezing  CV: Good distal perfusion; normal heart rate  Extremities: No deformity  Neurologic Exam: Cranial nerves II-XII are grossly intact; no focal deficit      Procedure Note  Procedure performed: Rigid nasal endoscopy  Indication: To evaluate for sinonasal pathology not visualized on routine anterior rhinoscopy  Anesthesia: 4% topical lidocaine with 0.05% oxymetazoline  Description of procedure: A 30 degree, 3 mm rigid endoscope was inserted into bilateral nasal cavities and the nasal valves, nasal cavity, middle meatus, sphenoethmoid recess, and nasopharynx were thoroughly evaluated for evidence of obstruction, edema, purulence, polyps and/or mass/lesion.     Sd-Ajith Endoscopic Scoring System  Endoscopic observation Right Left   Polyps in middle meatus (0 = absent, 1 = restricted to middle meatus, 2 = Beyond middle meatus) 0 0   Discharge (0 = absent, 1 = thin and clear, 2 = thick, purulent) 0 0   Edema (0 = absent, 1 = mild-moderate, 2 = moderate-severe) 1 1   Crusting (0 = absent, 1 = mild-moderate, 2 = moderate-severe) 0 0   Scarring (0= absent, 1 = mild-moderate, 2 = moderate-severe) 0 0   Total 1 1     Findings  RT: Ethmoid, sphenoid and maxillary clear; there is mild edema of the frontal recess  LT: Ethmoid, sphenoid and maxillary clear; there is mild edema of the frontal recess    Nasopharynx is clear    The patient tolerated the procedure well without complication.     Laboratory Review:  n/a    Imaging Review:  I reviewed her preoperative CT sinus from 2015: There is moderate mucosal thickening of bilateral max ethmoid and frontal recess    Pathology Review:  n/a    Assessment/Medical Decision Making/Plan:  Vocal cord dysfunction  Chronic sinusitis with nasal polyposis  Nasal congestion    I do not believe that sinonasal inflammation is playing a large role in her  sensation of nasal obstruction.  There may be an allergic component, and she is interested in pursuing possible allergy that may be contributing to her symptoms of poor nasal breathing.  She is very insightful admits that many of her symptoms may be related to underlying anxiety and she is seeing a therapist for this.  She would like referral to speech therapy for vocal cord dysfunction, so we will refer her accordingly.  I advised her that if her sinus symptoms were to act up she should resume saline irrigations and Flonase as needed.  Today, however, her sinuses look quite good.      Daniel Galvez MD    [facility]  Rhinology  Endoscopic Skull Base Surgery  [facility]  Department of Otolaryngology - Head & Neck Surgery

## 2020-08-14 NOTE — NURSING NOTE
Chief Complaint   Patient presents with     Consult     Difficulty breathing through nose         Pulse 61, temperature 98.1  F (36.7  C), weight 56.7 kg (125 lb), SpO2 99 %.    Danni Resendiz, EMT

## 2020-08-14 NOTE — PATIENT INSTRUCTIONS
You were seen in the ENT clinic today with Dr. Galvez    Recommendations for you:    -Consult with our Speech Therapist for Vocal Cord Dysfunction    -Consult with an allergist to be evaluated for seasonal allergies   -Flonase Nasal Spray: 2 sprays into both nostrils twice daily    You may follow up with Dr. Galvez as needed    Please call our clinic for any questions, concerns, and/or worsening symptoms.      Clinic #[phone]       Option 1 for scheduling.    Thank you for allowing us to be apart of your care!    Lela CARTER, REYNALDOCC    If you need to reach me my direct line is: [phone]

## 2020-08-14 NOTE — PROGRESS NOTES
[facility]                   New Patient Visit      Encounter date: August 14, 2020      Chief Complaint: chronic sinusitis; nasal congestion    History of Present Illness: Stacey Rod is a 19-year-old woman who I am seeing for difficulty breathing through the nose.  She does have a history of chronic sinusitis with nasal polyposis.  She underwent endoscopic sinus surgery in [facility] approximately 5 years ago.  This helped with symptoms of facial pain, pressure, and nasal drainage.  She complains today of persistent difficulty breathing through the nose and also through the mouth.  She has constant feelings of shortness of breath.  She does show some insight that she feels like there is some of this that may be related to anxiety and she is seeing a therapist for this.  She has seen speech therapy in the past for possible vocal cord dysfunction, but this was only a virtual visit.  She is interested in having referral here locally.  There is question of some component of seasonal allergic rhinitis which may be contributing to her nasal congestion.  She denies significant sneezing fits, itchy eyes, clear rhinorrhea.      Review of systems: A 14-point review of systems has been conducted and was negative for any notable symptoms, except as dictated in the history of present illness.     PMH: CRSwNP    PSH: ESS in 2015      Family History   Problem Relation Age of Onset     Melanoma No family hx of      Skin Cancer No family hx of         Social History     Socioeconomic History     Marital status: Single     Spouse name: Not on file     Number of children: Not on file     Years of education: Not on file     Highest education level: Not on file   Occupational History     Not on file   Social Needs     Financial resource strain: Not on file     Food insecurity     Worry: Not on file     Inability: Not on file     Transportation needs     Medical: Not on file     Non-medical: Not  on file   Tobacco Use     Smoking status: Former Smoker     Packs/day: 0.00     Smokeless tobacco: Never Used   Substance and Sexual Activity     Alcohol use: Not on file     Drug use: Not on file     Sexual activity: Not on file   Lifestyle     Physical activity     Days per week: Not on file     Minutes per session: Not on file     Stress: Not on file   Relationships     Social connections     Talks on phone: Not on file     Gets together: Not on file     Attends Restoration service: Not on file     Active member of club or organization: Not on file     Attends meetings of clubs or organizations: Not on file     Relationship status: Not on file     Intimate partner violence     Fear of current or ex partner: Not on file     Emotionally abused: Not on file     Physically abused: Not on file     Forced sexual activity: Not on file   Other Topics Concern     Parent/sibling w/ CABG, MI or angioplasty before 65F 55M? Not Asked   Social History Narrative     Not on file        Physical Exam:  Vital signs: There were no vitals taken for this visit.   General Appearance: No acute distress, appropriate demeanor, conversant  Eyes: moist conjunctivae; EOMI; pupils symmetric; visual acuity grossly intact; no proptosis  Head: normocephalic; overall symmetric appearance without deformity  Face: overall symmetric without deformity; HB I/VI  Ears: Normal appearance of external ear; external meatus normal in appearance; TMs intact without perforation bilaterally;   Nose: No external deformity; septum midline; inferior turbinates without significant hypertrophy  Oral Cavity/oropharynx: Normal appearance of mucosa; tongue midline; no mass or lesions; oropharynx without obvious mucosal abnormality  Neck: no palpable lymphadenopathy; thyroid without palpable nodules  Lungs: symmetric chest rise; no wheezing  CV: Good distal perfusion; normal heart rate  Extremities: No deformity  Neurologic Exam: Cranial nerves II-XII are grossly  intact; no focal deficit      Procedure Note  Procedure performed: Rigid nasal endoscopy  Indication: To evaluate for sinonasal pathology not visualized on routine anterior rhinoscopy  Anesthesia: 4% topical lidocaine with 0.05% oxymetazoline  Description of procedure: A 30 degree, 3 mm rigid endoscope was inserted into bilateral nasal cavities and the nasal valves, nasal cavity, middle meatus, sphenoethmoid recess, and nasopharynx were thoroughly evaluated for evidence of obstruction, edema, purulence, polyps and/or mass/lesion.     Sd-Ajith Endoscopic Scoring System  Endoscopic observation Right Left   Polyps in middle meatus (0 = absent, 1 = restricted to middle meatus, 2 = Beyond middle meatus) 0 0   Discharge (0 = absent, 1 = thin and clear, 2 = thick, purulent) 0 0   Edema (0 = absent, 1 = mild-moderate, 2 = moderate-severe) 1 1   Crusting (0 = absent, 1 = mild-moderate, 2 = moderate-severe) 0 0   Scarring (0= absent, 1 = mild-moderate, 2 = moderate-severe) 0 0   Total 1 1     Findings  RT: Ethmoid, sphenoid and maxillary clear; there is mild edema of the frontal recess  LT: Ethmoid, sphenoid and maxillary clear; there is mild edema of the frontal recess    Nasopharynx is clear    The patient tolerated the procedure well without complication.     Laboratory Review:  n/a    Imaging Review:  I reviewed her preoperative CT sinus from 2015: There is moderate mucosal thickening of bilateral max ethmoid and frontal recess    Pathology Review:  n/a    Assessment/Medical Decision Making/Plan:  Vocal cord dysfunction  Chronic sinusitis with nasal polyposis  Nasal congestion    I do not believe that sinonasal inflammation is playing a large role in her sensation of nasal obstruction.  There may be an allergic component, and she is interested in pursuing possible allergy that may be contributing to her symptoms of poor nasal breathing.  She is very insightful admits that many of her symptoms may be related to  underlying anxiety and she is seeing a therapist for this.  She would like referral to speech therapy for vocal cord dysfunction, so we will refer her accordingly.  I advised her that if her sinus symptoms were to act up she should resume saline irrigations and Flonase as needed.  Today, however, her sinuses look quite good.      Daniel Galvez MD    [facility]  Rhinology  Endoscopic Skull Base Surgery  [facility]  Department of Otolaryngology - Head & Neck Surgery

## 2020-08-14 NOTE — TELEPHONE ENCOUNTER
LVM asking pt to call and set up New Video or Telephone Visit with next available or pateint choice SLP (Carlos Rajan, Janette Elizabeth, or Alicia Tian) for Vocal Cord Dysfunction per provider referral. Left call center number for scheduling.    Pt should also schedule new Allergy consult per provider referral. Allergy Clinic: [phone]

## 2020-08-17 NOTE — TELEPHONE ENCOUNTER
FUTURE VISIT INFORMATION      FUTURE VISIT INFORMATION:    Date: 9/8/20    Time: 11:00am    Location: [facility]  REFERRAL INFORMATION:    Referring provider:  Dr. Galvez    Referring providers clinic:  [facility]    Reason for visit/diagnosis  VCD    RECORDS REQUESTED FROM:       Clinic name Comments Records Status Imaging Status   [facility] Ov/referral 8/14/20 Epic

## 2020-09-08 ENCOUNTER — VIRTUAL VISIT (OUTPATIENT)
Dept: OTOLARYNGOLOGY | Facility: CLINIC | Age: 20
End: 2020-09-08
Attending: OTOLARYNGOLOGY
Payer: COMMERCIAL

## 2020-09-08 ENCOUNTER — PRE VISIT (OUTPATIENT)
Dept: OTOLARYNGOLOGY | Facility: CLINIC | Age: 20
End: 2020-09-08

## 2020-09-08 DIAGNOSIS — R49.0 DYSPHONIA: Primary | ICD-10-CM

## 2020-09-08 DIAGNOSIS — J38.3 VOCAL CORD DYSFUNCTION: ICD-10-CM

## 2020-09-08 NOTE — PROGRESS NOTES
Stacey Rod is a 19 year old female who is being evaluated via a billable video visit.      The patient has been notified and verbally consented to the following:     This video visit will be conducted between you and your provider.    Patient has opted to conduct today's video visit vs an in-person appointment, and is not able to attend due to possible exposure to COVID-19.      If during the course of the call the provider feels a video visit is not appropriate, you will not be charged for this service.    Call initiated at: 11:01  Type of Video Platform Used: [employer]  Location of provider: Residence  Location of patient: Residence    [facility]  Evaluation report    Clinician: Rafael Rajan M.M., M.A., CCC/SLP  Referring physician:  Dr. Galvez  Patient: Stacey Rod  Date of Visit: 9/8/2020    HISTORY  Chief complaint: Stacey Rod is a 19 year old woman presenting today for evaluation of breathing difficulties.    Salient history: She was seen by Dr. Galvez with complaints of difficulty breathing in through the nose and mouth.  She had undergone endoscopic sinus surgery at her home in [facility] approximately 5 years ago which helped with sensation of a facial pain, pressure, nasal drainage.  At her appointment with Dr. Galvez it was felt that sinus inflammation did not play a significant role in her breathing symptoms, but that allergy and potentially vocal cord dysfunction may.  She also acknowledged that symptoms of anxiety played a role in her symptoms and she was working with a therapist to help manage this.  She had been seen previously by speech therapy for vocal cord dysfunction, but it was a virtual visit and she was interested in pursuing care locally to the [facility].  With this in mind she was referred to our clinic for further evaluation and treatment as warranted.  She presents for this today.      Today she reports that she had bronchitis in [place] in the summer of 2019  while traveling independently.  She had difficulties accessing good health care and continued to have pretty difficulties the entire time she was gone.  These resurfaced in a new way upon returning to [place] with a constant sense of concern that she was unable to breathe and a sensation of something sticking or blocking in her throat.  She was seen by an ENT physician in [place] who performed nasal endoscopy and stated that she had some reflux changes, and that her symptoms could be considered vocal cord dysfunction.  She would referred to a local speech-language pathologist, and 4 sessions of therapy were completed more recently in June of this year.  It was also clear that there was a significant element of anxiety playing a role, and she began working with a therapist to manage this.  She feels that symptoms have improved so that it is no longer constant symptom however, symptoms not fully managed and she is working to establish local services to help with this both with regards to today's appointment as well as with finding a therapist to help continue to work with her anxiety.    CURRENT SYMPTOMS INCLUDE  VOICE    No correlation to voicing    COUGH/THROAT CLEARING    Will cough and clear her throat when she feels the sensation of something in her throat     SWALLOWING    Eating and drinking can lead her to a residual feeling of something in her throat    No food getting stuck    No aspiration symptons    No PNA    BREATHING    No breathing noise    Difficult to breathe both in and out    Hard to take a full breath    Harder to breathe through her nose    Patient denies significant dysphagia, dysphonia and pain.     OTHER PERTINENT HISTORY    Otherwise unknown.  Please also refer to Dr. Galvez's dictation.     OBJECTIVE  PATIENT REPORTED MEASURES    Patient was unable to complete her intake forms prior to today's appointment.  They will be sent to her independently and added when they are received.    PERCEPTUAL  EVALUATION (CPT 72949)  POSTURE / TENSION:     neck    BREATHING:     appears within normal limits and adequate     shallow    phonation is not coordinated with respiration    LARYNGEAL PALPATION:     No significant discomfort in the thyrohyoid space, though patient acknowledged this was near the location of the globus sensation    Sensation was even more dramatic along the thyroid lamina, and ultimately was localized more specifically to the proximal trachea/cricothyroid space    VOICE:    Roughness: Mild Intermittent (glottal blanton)    Breathiness: Minimal    Strain: Mild Intermittent    MPT - 15    Loudness    Conversational speech:  WNL    Pitch:    Conversational speech:      Resonance:    Conversational speech:  laryngeal pharyngeal resonance and consistent use of glottal blanton beyond social norms    Singing vs. Speech: Decreased roughness noted in singing versus running speech    CAPE-V Overall Severity:  13/100    COUGH/THROAT CLEARING:    Not observed    THERAPY PROBES: Improvement was elicited with coordination of respiration and phonation and use of rescue breathing strategies    ASSESSMENT / PLAN  IMPRESSIONS: Stacey Rod is presenting today with R49.0 (Dysphonia) and J38.3 (Vocal dysfunction) in the context of nonoptimal laryngeal and respiratory mechanics, significant stress and anxiety in the wake of her illness abroad, and possible gastroesophageal reflux.  Today's evaluation demonstrates upper thoracic predominant shallow breathing pattern, that does not seem to have excessive muscular recruitment; however, seems overly controlled and measured corresponding to an increase in effort with breathing.  Inspiratory stridor was not noted, nor is it reported with patient's symptoms.  Perceptual quality of voicing during evaluation point towards glottal regulation of airflow which contribute to her overall symptoms.    STIMULABILITY: results of therapy probes during perceptual and laryngeal  evaluation demonstrate improvement with coordination of respiration and phonation and use of rescue breathing strategies    RECOMMENDATIONS:     A course of speech therapy is recommended to help reduce Breathing difficulties with nonoptimal laryngeal and respiratory mechanics.    She demonstrates a Good prognosis for improvement given adherence to therapeutic recommendations.     Positive indicators: positive response to therapy probes diagnosis is known to respond to treatment previously positive response to behavioral therapy    Negative indicators: None    DURATION / FREQUENCY: 3 biweekly and 2 monthly one-hour sessions    GOALS:  Patient goal:   1. To understand the problem and fix it as much as possible  2. To breathe normally and comfortably in all situations    Short-term goal(s): Within the first 4 sessions, Ms. Rod:  1. will demonstrate assigned laryngeal massage techniques with 80% accuracy or better with no clinician support  2. will be able to independently list key factors in maintenance of good vocal hygiene with 80% accuracy, and report on their use outside the therapy room.  3. will utilize silent inhalation with good low-respiratory engagement 75% of the time during therapy tasks with minimal clinician support  4. will demonstrate semi-occluded vocal tract (SOVT) exercises with at least 80% accuracy with no clinician support  5. Will demonstrate retrograde strategies with 100% accuracy and no clinician support    Long-term goal(s): In 6 months, Ms. Rod will:  1. Report a week of typical activities, in which Breathing difficulties does not exceed a level of 2 out of 10, 80% of the time    This treatment plan was developed with the patient who agreed with the recommendations.    _______________________________________________________________________  THERAPY NOTE (CPT 60554)  Date of Service: 9/8/2020    SUBJECTIVE / OBJECTIVE:  Please refer to my evaluation report from today's encounter for  "full details regarding subjective data, patient reported measures, and diagnostic findings.    THERAPEUTIC ACTIVITIES  Counseling and Education    Asked many questions about the nature of her symptoms, and I answered all of these thoroughly.    Instructed concepts and techniques for optimal vocal hygiene including:    Systemic hydration, including strategies for increasing daily water intake    Topical hydration - Gargling, saline nasal irrigation, humidification, steam, guaifenesin    Environmental barriers to healthy voicing - noise, inhaled irritants, room acoustics    Management of laryngopharyngeal reflux disease (LPRD)    Dietary alterations which may reduce liklihood of reflux events    Avoiding eating or drinking within 2-3 hours of bedtime    Raising the headboard of the bed by 3-4 inches    Avoiding eating and drinking immediately prior to rigorous activity    Proper timing and use of acid reflux medication discussed in general context with recommendation of follow-up with pharmacist for detailed instructions    Exercises to promote optimal respiratory mechanics    I provided explanation of the anatomy and physiology of respiration for speech and singing; she found this to be helpful    She demonstrated excessive upper thoracic engagement during inhalation    Rescue breathing strategies using oral configurations to promote improved vocal fold abduction were instructed    Patient reported pursed lip inhalation was most beneficial    Patient was able to demonstrate use of these techniques in combination with low respiratory engagement with good accuracy and minimal clinician support    A plan for when and how to implement these strategies was developed, and the patient was encouraged to practice the techniques independent of distress two times daily to habituate their use.    Balanced pattern of inhalation versus exhalation to avoid breath stacking was also targeted with awareness of recoil force \"release\" " with adequate exhalation      Concepts of an optimal regimen for practice were instructed.  o She should use an interval schedule of practice, with brief periods of practice frequently throughout each day  o [place] concepts of volitional practice to facilitate motor learning.    I provided an audio recording and handouts of today's therapeutic activities to facilitate practice.    ASSESSMENT/PLAN  PROGRESS TOWARD LONG TERM GOALS:   Minimal at this point, as this is first session, but good learning today    IMPRESSIONS: R49.0 (Dysphonia) and J38.3 (Vocal dysfunction) in the context of nonoptimal laryngeal and respiratory mechanics, significant stress and anxiety in the wake of her illness abroad, and possible gastroesophageal reflux. Ms. Rod reported significantly improved ease with rescue breathing strategies and focus on balanced patterns of inhalation and exhalation.  Also be timing of presentation of her symptoms sounds as if it may be exacerbated abdomen 1 by gastroesophageal reflux and basic reflux precautions were discussed and presented today.    PLAN: I will see Ms. Rod in approximately 3 weeks, at which time we will continue to advance optimization of laryngeal respiratory mechanics.     TOTAL SERVICE TIME: 60 minutes  EVALUATION OF VOICE AND RESONANCE (62772)  TREATMENT (55569)  NO CHARGE FACILITY FEE (77671)    Rafael Rajan M.M., M.A., CCC-SLP  Speech-Language Pathologist  Certificate of Vocology  [phone]    *this report was created in part through the use of computerized dictation software, and though reviewed following completion, some typographic errors may persist.  If there is confusion regarding any of this notes contents, please contact me for clarification.*

## 2020-09-08 NOTE — PATIENT INSTRUCTIONS
"Hi Neva,    I've attached breathing exercises from today as well as a questionnaire I would love your answers to when you have a chance to fill it out.  Also with the idea of estabilishing a baseline I'd love your answer to these two questions as well:    Overall how would you rate your breathing if 10 is best and 0 is worst?  How much does your breathing bother you? not at all => a little bit => somewhat => quite a bit => very much    For now just focus on watching out for reflux, use gargling and humidificaiton to take care of your throat.  When you have an episode focus on pursed lip breathing (see rescue breathing handouts) and exhaling enough to feel the \"release\" as you start to inhale. If you don't get called about scheduling reach out to [phone] and you can make the appointments at your liesure.    -TAVIA Zamora?.TAVIA. (voice), MDustyA. CCC-SLP    [facility]    pronouns: He / Him / His  [phone]  [email or web address]   [email or web address]  "

## 2020-09-08 NOTE — LETTER
9/8/2020       RE: Stacey Rod  [id number] [facility] [address]     Dear Colleague,    Thank you for referring your patient, Stacey Rod, to the [facility]. Please see a copy of my visit note below.    Stacey Rod is a 19 year old female who is being evaluated via a billable video visit.      The patient has been notified and verbally consented to the following:     This video visit will be conducted between you and your provider.    Patient has opted to conduct today's video visit vs an in-person appointment, and is not able to attend due to possible exposure to COVID-19.      If during the course of the call the provider feels a video visit is not appropriate, you will not be charged for this service.    Call initiated at: 11:01  Type of Video Platform Used: Advanced Image Enhancement  Location of provider: Residence  Location of patient: [facility]  Evaluation report    Clinician: Rafael Rajan M.M., M.A., CCC/SLP  Referring physician:  Dr. Galvez  Patient: Stacey Rod  Date of Visit: 9/8/2020    HISTORY  Chief complaint: Stacey Rod is a 19 year old woman presenting today for evaluation of breathing difficulties.    Salient history: She was seen by Dr. Galvez with complaints of difficulty breathing in through the nose and mouth.  She had undergone endoscopic sinus surgery at her home in [facility] approximately 5 years ago which helped with sensation of a facial pain, pressure, nasal drainage.  At her appointment with Dr. Galvez it was felt that sinus inflammation did not play a significant role in her breathing symptoms, but that allergy and potentially vocal cord dysfunction may.  She also acknowledged that symptoms of anxiety played a role in her symptoms and she was working with a therapist to help manage this.  She had been seen previously by speech therapy for vocal cord dysfunction, but it was a virtual visit  and she was interested in pursuing care locally to the [facility].  With this in mind she was referred to our clinic for further evaluation and treatment as warranted.  She presents for this today.      Today she reports that she had bronchitis in [place] in the summer of 2019 while traveling independently.  She had difficulties accessing good health care and continued to have pretty difficulties the entire time she was gone.  These resurfaced in a new way upon returning to [place] with a constant sense of concern that she was unable to breathe and a sensation of something sticking or blocking in her throat.  She was seen by an ENT physician in [place] who performed nasal endoscopy and stated that she had some reflux changes, and that her symptoms could be considered vocal cord dysfunction.  She would referred to a local speech-language pathologist, and 4 sessions of therapy were completed more recently in June of this year.  It was also clear that there was a significant element of anxiety playing a role, and she began working with a therapist to manage this.  She feels that symptoms have improved so that it is no longer constant symptom however, symptoms not fully managed and she is working to establish local services to help with this both with regards to today's appointment as well as with finding a therapist to help continue to work with her anxiety.    CURRENT SYMPTOMS INCLUDE  VOICE    No correlation to voicing    COUGH/THROAT CLEARING    Will cough and clear her throat when she feels the sensation of something in her throat     SWALLOWING    Eating and drinking can lead her to a residual feeling of something in her throat    No food getting stuck    No aspiration symptons    No PNA    BREATHING    No breathing noise    Difficult to breathe both in and out    Hard to take a full breath    Harder to breathe through her nose    Patient denies significant dysphagia, dysphonia and pain.     OTHER PERTINENT  HISTORY    Otherwise unknown.  Please also refer to Dr. Galvez's dictation.     OBJECTIVE  PATIENT REPORTED MEASURES    Patient was unable to complete her intake forms prior to today's appointment.  They will be sent to her independently and added when they are received.    PERCEPTUAL EVALUATION (CPT 62771)  POSTURE / TENSION:     neck    BREATHING:     appears within normal limits and adequate     shallow    phonation is not coordinated with respiration    LARYNGEAL PALPATION:     No significant discomfort in the thyrohyoid space, though patient acknowledged this was near the location of the globus sensation    Sensation was even more dramatic along the thyroid lamina, and ultimately was localized more specifically to the proximal trachea/cricothyroid space    VOICE:    Roughness: Mild Intermittent (glottal blanton)    Breathiness: Minimal    Strain: Mild Intermittent    MPT - 15    Loudness    Conversational speech:  WNL    Pitch:    Conversational speech:      Resonance:    Conversational speech:  laryngeal pharyngeal resonance and consistent use of glottal blanton beyond social norms    Singing vs. Speech: Decreased roughness noted in singing versus running speech    CAPE-V Overall Severity:  13/100    COUGH/THROAT CLEARING:    Not observed    THERAPY PROBES: Improvement was elicited with coordination of respiration and phonation and use of rescue breathing strategies    ASSESSMENT / PLAN  IMPRESSIONS: Stacey Rod is presenting today with R49.0 (Dysphonia) and J38.3 (Vocal dysfunction) in the context of nonoptimal laryngeal and respiratory mechanics, significant stress and anxiety in the wake of her illness abroad, and possible gastroesophageal reflux.  Today's evaluation demonstrates upper thoracic predominant shallow breathing pattern, that does not seem to have excessive muscular recruitment; however, seems overly controlled and measured corresponding to an increase in effort with breathing.  Inspiratory  stridor was not noted, nor is it reported with patient's symptoms.  Perceptual quality of voicing during evaluation point towards glottal regulation of airflow which contribute to her overall symptoms.    STIMULABILITY: results of therapy probes during perceptual and laryngeal evaluation demonstrate improvement with coordination of respiration and phonation and use of rescue breathing strategies    RECOMMENDATIONS:     A course of speech therapy is recommended to help reduce Breathing difficulties with nonoptimal laryngeal and respiratory mechanics.    She demonstrates a Good prognosis for improvement given adherence to therapeutic recommendations.     Positive indicators: positive response to therapy probes diagnosis is known to respond to treatment previously positive response to behavioral therapy    Negative indicators: None    DURATION / FREQUENCY: 3 biweekly and 2 monthly one-hour sessions    GOALS:  Patient goal:   1. To understand the problem and fix it as much as possible  2. To breathe normally and comfortably in all situations    Short-term goal(s): Within the first 4 sessions, Ms. Rod:  1. will demonstrate assigned laryngeal massage techniques with 80% accuracy or better with no clinician support  2. will be able to independently list key factors in maintenance of good vocal hygiene with 80% accuracy, and report on their use outside the therapy room.  3. will utilize silent inhalation with good low-respiratory engagement 75% of the time during therapy tasks with minimal clinician support  4. will demonstrate semi-occluded vocal tract (SOVT) exercises with at least 80% accuracy with no clinician support  5. Will demonstrate retrograde strategies with 100% accuracy and no clinician support    Long-term goal(s): In 6 months, Ms. Rod will:  1. Report a week of typical activities, in which Breathing difficulties does not exceed a level of 2 out of 10, 80% of the time    This treatment plan was developed  with the patient who agreed with the recommendations.    _______________________________________________________________________  THERAPY NOTE (CPT 12181)  Date of Service: 9/8/2020    SUBJECTIVE / OBJECTIVE:  Please refer to my evaluation report from today's encounter for full details regarding subjective data, patient reported measures, and diagnostic findings.    THERAPEUTIC ACTIVITIES  Counseling and Education    Asked many questions about the nature of her symptoms, and I answered all of these thoroughly.    Instructed concepts and techniques for optimal vocal hygiene including:    Systemic hydration, including strategies for increasing daily water intake    Topical hydration - Gargling, saline nasal irrigation, humidification, steam, guaifenesin    Environmental barriers to healthy voicing - noise, inhaled irritants, room acoustics    Management of laryngopharyngeal reflux disease (LPRD)    Dietary alterations which may reduce liklihood of reflux events    Avoiding eating or drinking within 2-3 hours of bedtime    Raising the headboard of the bed by 3-4 inches    Avoiding eating and drinking immediately prior to rigorous activity    Proper timing and use of acid reflux medication discussed in general context with recommendation of follow-up with pharmacist for detailed instructions    Exercises to promote optimal respiratory mechanics    I provided explanation of the anatomy and physiology of respiration for speech and singing; she found this to be helpful    She demonstrated excessive upper thoracic engagement during inhalation    Rescue breathing strategies using oral configurations to promote improved vocal fold abduction were instructed    Patient reported pursed lip inhalation was most beneficial    Patient was able to demonstrate use of these techniques in combination with low respiratory engagement with good accuracy and minimal clinician support    A plan for when and how to implement these strategies  "was developed, and the patient was encouraged to practice the techniques independent of distress two times daily to habituate their use.    Balanced pattern of inhalation versus exhalation to avoid breath stacking was also targeted with awareness of recoil force \"release\" with adequate exhalation      Concepts of an optimal regimen for practice were instructed.  o She should use an interval schedule of practice, with brief periods of practice frequently throughout each day  o [facility] concepts of volitional practice to facilitate motor learning.    I provided an audio recording and handouts of today's therapeutic activities to facilitate practice.    ASSESSMENT/PLAN  PROGRESS TOWARD LONG TERM GOALS:   Minimal at this point, as this is first session, but good learning today    IMPRESSIONS: R49.0 (Dysphonia) and J38.3 (Vocal dysfunction) in the context of nonoptimal laryngeal and respiratory mechanics, significant stress and anxiety in the wake of her illness abroad, and possible gastroesophageal reflux. Ms. Rod reported significantly improved ease with rescue breathing strategies and focus on balanced patterns of inhalation and exhalation.  Also be timing of presentation of her symptoms sounds as if it may be exacerbated abdomen 1 by gastroesophageal reflux and basic reflux precautions were discussed and presented today.    PLAN: I will see Ms. Rdo in approximately 3 weeks, at which time we will continue to advance optimization of laryngeal respiratory mechanics.     TOTAL SERVICE TIME: 60 minutes  EVALUATION OF VOICE AND RESONANCE (40503)  TREATMENT (01660)  NO CHARGE FACILITY FEE (81503)    Rafael Rajan M.M., M.A., CCC-SLP  Speech-Language Pathologist  Certificate of Vocology  [phone]    *this report was created in part through the use of computerized dictation software, and though reviewed following completion, some typographic errors may persist.  If there is confusion regarding any of this notes " contents, please contact me for clarification.*      Again, thank you for allowing me to participate in the care of your patient.      Sincerely,    Carlos Rajan, SLP

## 2020-10-01 ENCOUNTER — VIRTUAL VISIT (OUTPATIENT)
Dept: OTOLARYNGOLOGY | Facility: CLINIC | Age: 20
End: 2020-10-01
Payer: COMMERCIAL

## 2020-10-01 DIAGNOSIS — R06.89 DIFFICULTY BREATHING: ICD-10-CM

## 2020-10-01 DIAGNOSIS — R49.0 DYSPHONIA: ICD-10-CM

## 2020-10-01 DIAGNOSIS — J38.3 VOCAL CORD DYSFUNCTION: Primary | ICD-10-CM

## 2020-10-01 PROCEDURE — 92507 TX SP LANG VOICE COMM INDIV: CPT | Mod: GN | Performed by: SPEECH-LANGUAGE PATHOLOGIST

## 2020-10-01 NOTE — PROGRESS NOTES
"Stacey Rod is a 19 year old female who is being evaluated via a billable video visit.      The patient has been notified and verbally consented to the following:     This video visit will be conducted between you and your provider.    Patient has opted to conduct today's video visit vs an in-person appointment, and is not able to attend due to possible exposure to COVID-19.      If during the course of the call the provider feels a video visit is not appropriate, you will not be charged for this service.    Call initiated at: 10:00  Type of Video Platform Used: Rising  Location of provider: Residence  Location of patient: Residence    [facility]  THERAPY NOTE (CPT 59287)    Patient: Stacey Rod  Date of Service: 10/1/2020  Referring physician: Dr. Galvez  Impressions from most recent evaluation:  \"Stacey Rod is presenting today with R49.0 (Dysphonia) and J38.3 (Vocal dysfunction) in the context of nonoptimal laryngeal and respiratory mechanics, significant stress and anxiety in the wake of her illness abroad, and possible gastroesophageal reflux.  Today's evaluation demonstrates upper thoracic predominant shallow breathing pattern, that does not seem to have excessive muscular recruitment; however, seems overly controlled and measured corresponding to an increase in effort with breathing.  Inspiratory stridor was not noted, nor is it reported with patient's symptoms.  Perceptual quality of voicing during evaluation point towards glottal regulation of airflow which contribute to her overall symptoms.\"    SUBJECTIVE:  Since the patient's last session, they report the following:     Overall symptoms are better    Hasn't been stressing about her breathing in the past week and a half    Maybe two very minimal episodes    Breathing out sufficiently to feel recoil has been helpful    Avoiding eating and drinking before bed    OBJECTIVE:  PATIENT REPORTED MEASURES:  Patient Supplied Answers To SLP " "QOL Questionnaire  Therapy Quality of Life 10/1/2020   Since my l ast session, I used the speech therapy exercises and strategies as recommended by my speech pathologist. Agree   I feel that using my therapy techniques has become a habit Neither agree nor disagree   I feel confident in my ability to manage my current and future symptoms. Agree   Since my last session I feel my symptoms have --------. Improved   Overall, since starting therapy I feel my symptoms are --------. Better   Overall, how much better? A good deal better     THERAPEUTIC ACTIVITIES    Counseling and Education:    Asked many questions about the nature of their symptoms, and I answered all of these thoroughly.    Exercises to promote reduced perilaryngeal muscle tension    Four way neck stretch instructed    Modifications for additional extension instructed    Base of tongue stretch instructed    Proper form for each stretch was emphasized, including:    Maintenance of posture for 10 breaths (~20-30 seconds)    Awareness of tension on inhalation with volitional relaxation into the stretch on exhalation    Avoidance of \"forcing\" a posture, only progressing far enough to feel the stretch    She noted increased sense of muscular relaxation following completion of the stretches    Manual Laryngeal massage was performed in combination with gentle forward resonant sounds    Significant tenderness of the thyrohyoid space (right predominate)     Thyrohyoid space, and base of tongue were targeted with gentle circular massage    Gentle sternocleidomastoid massage was also performed.    Patient was trained to focus on intentional relaxation of jaw and tongue in addition to area of massage during these maneuvers.    Self-massage was instructed and patient was able to demonstrate this with acceptable accuracy  Exercises to promote optimal respiratory mechanics    Patient demonstrated previously assigned exercises including rescue breathing strategies with " good accuracy    These techniques were put into practice with a focus on release of tension and improve somatosensory awareness via the principles of a body scan meditation    Patient was able to demonstrate this with good accuracy and reported improved relaxation following    She was encouraged to use these techniques along with the above perilaryngeal stretches and massage to bolster the effect of those techniques      A regimen for home practice was instructed.    I provided an audio recording and handouts of today's therapeutic activities to facilitate practice.    ASSESSMENT/PLAN  PROGRESS TOWARD LONG TERM GOALS:   Good progress; please see report above for objective measures    IMPRESSIONS: R49.0 (Dysphonia) and J38.3 (Vocal dysfunction) in the context of nonoptimal laryngeal and respiratory mechanics, significant stress and anxiety in the wake of her illness abroad, and possible gastroesophageal reflux. Stacey is doing very well, reporting great reduction in the severity and frequency of her breathing episodes.  She feels that maintaining adequate exhalation to avoid breath stacking has been very helpful.  Today introduced manual therapy techniques as well as somatosensory building techniques.  Good accuracy with the use of these techniques was seen.    PLAN: I will see Stacey in approximately 3 weeks, if her symptoms have continued to stay resolved that appointment may be canceled at which point we will follow-up in 1 month to gauge maintenance of gains.    For practice goals see AVS.       TOTAL SERVICE TIME: 60 minutes  TREATMENT (68644): 60 minutes  NO CHARGE FACILITY FEE (99885)    Rafael Rajan M.M., M.A., CCC-SLP  Speech-Language Pathologist  Certificate of Vocology  [phone]    *this report was created in part through the use of computerized dictation software, and though reviewed following completion, some typographic errors may persist.  If there is confusion regarding any of this notes  contents, please contact me for clarification.*

## 2020-10-01 NOTE — LETTER
"10/1/2020       RE: Stacey Rod  [id number] [facility] [address]     Dear Colleague,    Thank you for referring your patient, Stacey Rod, to the [facility]ICE [facility] [place] [facility]. Please see a copy of my visit note below.    Stacey Rod is a 19 year old female who is being evaluated via a billable video visit.      The patient has been notified and verbally consented to the following:     This video visit will be conducted between you and your provider.    Patient has opted to conduct today's video visit vs an in-person appointment, and is not able to attend due to possible exposure to COVID-19.      If during the course of the call the provider feels a video visit is not appropriate, you will not be charged for this service.    Call initiated at: 10:00  Type of Video Platform Used: [employer]  Location of provider: Residence  Location of patient: [facility]  THERAPY NOTE (CPT 56469)    Patient: Stacey Rod  Date of Service: 10/1/2020  Referring physician: Dr. Galvez  Impressions from most recent evaluation:  \"Stacey Rod is presenting today with R49.0 (Dysphonia) and J38.3 (Vocal dysfunction) in the context of nonoptimal laryngeal and respiratory mechanics, significant stress and anxiety in the wake of her illness abroad, and possible gastroesophageal reflux.  Today's evaluation demonstrates upper thoracic predominant shallow breathing pattern, that does not seem to have excessive muscular recruitment; however, seems overly controlled and measured corresponding to an increase in effort with breathing.  Inspiratory stridor was not noted, nor is it reported with patient's symptoms.  Perceptual quality of voicing during evaluation point towards glottal regulation of airflow which contribute to her overall symptoms.\"    SUBJECTIVE:  Since the patient's last session, they report the following:     Overall " "symptoms are better    Hasn't been stressing about her breathing in the past week and a half    Maybe two very minimal episodes    Breathing out sufficiently to feel recoil has been helpful    Avoiding eating and drinking before bed    OBJECTIVE:  PATIENT REPORTED MEASURES:  Patient Supplied Answers To SLP QOL Questionnaire  Therapy Quality of Life 10/1/2020   Since my l ast session, I used the speech therapy exercises and strategies as recommended by my speech pathologist. Agree   I feel that using my therapy techniques has become a habit Neither agree nor disagree   I feel confident in my ability to manage my current and future symptoms. Agree   Since my last session I feel my symptoms have --------. Improved   Overall, since starting therapy I feel my symptoms are --------. Better   Overall, how much better? A good deal better     THERAPEUTIC ACTIVITIES    Counseling and Education:    Asked many questions about the nature of their symptoms, and I answered all of these thoroughly.    Exercises to promote reduced perilaryngeal muscle tension    Four way neck stretch instructed    Modifications for additional extension instructed    Base of tongue stretch instructed    Proper form for each stretch was emphasized, including:    Maintenance of posture for 10 breaths (~20-30 seconds)    Awareness of tension on inhalation with volitional relaxation into the stretch on exhalation    Avoidance of \"forcing\" a posture, only progressing far enough to feel the stretch    She noted increased sense of muscular relaxation following completion of the stretches    Manual Laryngeal massage was performed in combination with gentle forward resonant sounds    Significant tenderness of the thyrohyoid space (right predominate)     Thyrohyoid space, and base of tongue were targeted with gentle circular massage    Gentle sternocleidomastoid massage was also performed.    Patient was trained to focus on intentional relaxation of jaw and " tongue in addition to area of massage during these maneuvers.    Self-massage was instructed and patient was able to demonstrate this with acceptable accuracy  Exercises to promote optimal respiratory mechanics    Patient demonstrated previously assigned exercises including rescue breathing strategies with good accuracy    These techniques were put into practice with a focus on release of tension and improve somatosensory awareness via the principles of a body scan meditation    Patient was able to demonstrate this with good accuracy and reported improved relaxation following    She was encouraged to use these techniques along with the above perilaryngeal stretches and massage to bolster the effect of those techniques      A regimen for home practice was instructed.    I provided an audio recording and handouts of today's therapeutic activities to facilitate practice.    ASSESSMENT/PLAN  PROGRESS TOWARD LONG TERM GOALS:   Good progress; please see report above for objective measures    IMPRESSIONS: R49.0 (Dysphonia) and J38.3 (Vocal dysfunction) in the context of nonoptimal laryngeal and respiratory mechanics, significant stress and anxiety in the wake of her illness abroad, and possible gastroesophageal reflux. Stacey is doing very well, reporting great reduction in the severity and frequency of her breathing episodes.  She feels that maintaining adequate exhalation to avoid breath stacking has been very helpful.  Today introduced manual therapy techniques as well as somatosensory building techniques.  Good accuracy with the use of these techniques was seen.    PLAN: I will see Stacey in approximately 3 weeks, if her symptoms have continued to stay resolved that appointment may be canceled at which point we will follow-up in 1 month to gauge maintenance of gains.    For practice goals see AVS.       TOTAL SERVICE TIME: 60 minutes  TREATMENT (85988): 60 minutes  NO CHARGE FACILITY FEE (27969)    Rafael  JULIA Rajan, M.A., CCC-SLP  Speech-Language Pathologist  Certificate of Vocology  [phone]    *this report was created in part through the use of computerized dictation software, and though reviewed following completion, some typographic errors may persist.  If there is confusion regarding any of this notes contents, please contact me for clarification.*          Again, thank you for allowing me to participate in the care of your patient.      Sincerely,    Carlos Rajan, SLP

## 2020-10-01 NOTE — PATIENT INSTRUCTIONS
"Kun Hooks,    I am so glad things are going well.  Continue the work that you have been doing, adding in the stretches for your neck and base of tongue, as well as the mindfulness type techniques to help you build awareness of tension that may contribute to your breathing difficulties.  You can use them with the grounded breathing exercise sheet that I sent you in the email, as well as with \"body scan\" meditations that you can look up online or through apps like calm or head space.  If you are still doing well in 2 weeks we may cancel your other October appointment (assuming that seems like a good fit to you), and if you have any questions in the meantime feel free to reach out to me!    -Carlos  "

## 2020-10-05 ENCOUNTER — TELEPHONE (OUTPATIENT)
Dept: DERMATOLOGY | Facility: CLINIC | Age: 20
End: 2020-10-05

## 2020-10-05 NOTE — TELEPHONE ENCOUNTER
Called pt and LVM. I let her know we need to reschedule her appointment today due to Dr. Fang being out sick. Clinic number provided to reschedule.   Mary Aiken, ELIZABETH

## 2020-10-08 NOTE — TELEPHONE ENCOUNTER
FUTURE VISIT INFORMATION      FUTURE VISIT INFORMATION:    Date: 10.13.20    Time: 12:45 AM    [facility]: [facility]  REFERRAL INFORMATION:    Referring provider:  Daniel Galvez    Referring providers clinic:  [facility]    Reason for visit/diagnosis  appt per pt- allergy test consult-Seasonal allergic rhinitis due to pollen [J30.1] Chronic sinusitis, unspecified location [J32.9], internal med recs and referral    RECORDS REQUESTED FROM:       Clinic name Comments Records Status Imaging Status   [facility] 8.14.20 Dr. Galvez [facility]    ENT consultants 5.20.20, 5.5.20 Dr. Rivero  8.20.19 [facility]  9.10.15 CT sinus received Received    oral surgery 1.14.19 [facility] Everywhere    [facility] 11.24.15 [facility] Everywhere

## 2020-10-13 ENCOUNTER — APPOINTMENT (OUTPATIENT)
Dept: LAB | Facility: CLINIC | Age: 20
End: 2020-10-13
Payer: COMMERCIAL

## 2020-10-13 ENCOUNTER — PRE VISIT (OUTPATIENT)
Dept: ALLERGY | Facility: CLINIC | Age: 20
End: 2020-10-13

## 2020-10-13 ENCOUNTER — OFFICE VISIT (OUTPATIENT)
Dept: ALLERGY | Facility: CLINIC | Age: 20
End: 2020-10-13
Attending: OTOLARYNGOLOGY
Payer: COMMERCIAL

## 2020-10-13 DIAGNOSIS — J32.9 RHINOSINUSITIS: ICD-10-CM

## 2020-10-13 DIAGNOSIS — H10.10 SEASONAL AND PERENNIAL ALLERGIC RHINOCONJUNCTIVITIS: Primary | ICD-10-CM

## 2020-10-13 DIAGNOSIS — J30.2 SEASONAL AND PERENNIAL ALLERGIC RHINOCONJUNCTIVITIS: Primary | ICD-10-CM

## 2020-10-13 DIAGNOSIS — J30.89 SEASONAL AND PERENNIAL ALLERGIC RHINOCONJUNCTIVITIS: Primary | ICD-10-CM

## 2020-10-13 PROCEDURE — 95004 PERQ TESTS W/ALRGNC XTRCS: CPT | Performed by: DERMATOLOGY

## 2020-10-13 PROCEDURE — 86003 ALLG SPEC IGE CRUDE XTRC EA: CPT | Mod: 90 | Performed by: PATHOLOGY

## 2020-10-13 PROCEDURE — 82785 ASSAY OF IGE: CPT | Mod: 90 | Performed by: PATHOLOGY

## 2020-10-13 PROCEDURE — 99203 OFFICE O/P NEW LOW 30 MIN: CPT | Mod: 25 | Performed by: DERMATOLOGY

## 2020-10-13 PROCEDURE — 95024 IQ TESTS W/ALLERGENIC XTRCS: CPT | Performed by: DERMATOLOGY

## 2020-10-13 ASSESSMENT — PAIN SCALES - GENERAL: PAINLEVEL: NO PAIN (0)

## 2020-10-13 NOTE — PROGRESS NOTES
[facility] Dermato-allergology note      Allergy Problem List:    Specialty Problems        Allergy Diagnoses    Acne vulgaris        Seasonal allergic rhinitis              CC:   No chief complaint on file.        Encounter Date: Oct 13, 2020    History of Present Illness:  I have reviewed the existing informations with patient personally, in Epic and other sources including the nursing intake corresponding to this issue. Stacey Rod is a 19 year old female who presents to the consult  in person     Patient has all the time runny nose, sneezing, postnasal drip, recurrent sinusitis, dry/red eyes.   In addition recurrent coughing to clear throat. No Asthma, not even exercise induced.    Some aggravation in Spring, but mostly entire year.  Mostly in the morning, more mucus     As child prick tests on back, but all negative    No problems with pets    Sometimes problems with stomach acidity (heart burn).     Uses Flonase once a while    Past Medical History:   Patient Active Problem List   Diagnosis     Acne vulgaris     Seasonal allergic rhinitis     No past medical history on file.    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 Patient has the following hobbies or non-occupational exposure ([employer], Rockclimbing)     reports that she has quit smoking. She smoked 0.00 packs per day. She has never used smokeless tobacco.      Family History:  Family History   Problem Relation Age of Onset     Melanoma No family hx of      Skin Cancer No family hx of        Medications:  Current Outpatient Medications   Medication Sig Dispense Refill     clindamycin (CLEOCIN T) 1 % external lotion Apply topically daily 60 mL 4     drospirenone-ethinyl estradiol (JUAN J) 3-0.03 MG tablet        escitalopram (LEXAPRO) 20 MG tablet Take 20 mg by mouth daily       fluticasone (FLONASE) 50 MCG/ACT nasal spray Spray 2  sprays into both nostrils 2 times daily 16 g 11     levonorgestrel (EWA) 13.5 MG IUD 1 each by Intrauterine route once       misoprostol (CYTOTEC) 200 MCG tablet        omeprazole (PRILOSEC) 20 MG DR capsule        tretinoin (RETIN-A) 0.05 % external cream Use nightly for acne as tolerated 45 g 5     triamcinolone (KENALOG) 0.1 % external ointment Apply topically 2 times daily 80 g 5     valACYclovir (VALTREX) 500 MG tablet              Review of Systems:  -As per HPI  -Constitutional: The patient denies fatigue, fevers, chills, unintended weight loss, and night sweats.  -HEENT: Patient denies nonhealing oral sores.  -Skin: As above in HPI. No additional skin concerns.    Physical exam:  Vitals: There were no vitals taken for this visit.  GEN: This is a well developed, well-nourished female in no acute distress, in a pleasant mood.    Skin: Focused examination of the test sites on arms within the consultation was performed.   See test results below  As above in HPI. No additional skin concerns.  - upper respiratory tract: currently no obvious Rhinitis  - lungs: no signs for active and present Asthma/Wheezing or coughing  - eyes: currently no active conjunctivits  - GI system/digestion: currently no problems, no bloating or Diarrhoea    Allergy Tests:    Past Allergy Test    Future Allergy Test: 10/13/2020     Order for PRICK TESTS    [x] Outpatient  [] Inpatient: [facility]..../ Bed ....      Skin Atopy (atopic dermatitis) [x] Yes   [] No pityriasis alba on arms, dermatitis on ear lobes  Contact allergies:   [x] Yes   [] No  Urticaria/Angioedema  [] Yes   [x] No  Rhinitis/Sinusitis:   [x] Yes   [] No   [] seasonal [x] perennial           Had in 2018 in Spring (April) for 1 month conjunctivitis both eyes  Allergic Asthma:   [] Yes   [x] No  Pets :  [x] Yes   [] No  Which? 2 dogs (retriever and mutt) and 1 cat  Food Allergy:   [] Yes   [x] No  Drug allergies:    [] Yes   [x] No      Reason for tests (suspected allergy):  perennial Rhinosinusitis with postnasal drip and some seasonal conjunctivitis (spring)  Known previous allergies: negatives    Standardized prick panels  [x] Atopic panel (20 tests)  [] Pediatric Panel (12 tests)  [] Milk, Meat, Eggs, Grains (20 tests)   [] Dust, Epithelia, Feathers (10 tests)  [] Fish, Seafood, Shellfish (17 tests)  [] Nuts, Beans (14 tests)  [] Spice, Vegetable, Fruit (17 tests)  [] Others: ...      [] Patient's own products: ...    DO NOT test if chemical or biological identity is unknown!     always ask from patient the product information and safety sheets (MSDS)     Standardized intradermal tests  [x] Penicillium notatum [x] Aspergillus fumigatus [x] House dust mites  [] Bee venom   [] Wasp venom !!Specific protocol with dilutions!!  [] Others: ...    [] Patient needs consultation with Allergy team (changes of tests may apply)  [] Tests discussed with Allergy team (can have direct appointment for allergy tests)     Atopy Screen (Placed 10/13/20 )    No Substance Readings (15 min) Evaluation   POS Histamine 1mg/ml ++    NEG NaCl 0.9% - Strong dermographism     No Substance Readings (15 min) Evaluation   1 Alternaria alternata (tenuis)  -    2 Cladosporium herbarum -    3 Aspergillus fumigatus -    4 Penicillium notatum -    5 Dermatophagoides pteronyssinus -    6 Dermatophagoides farinae -    7 Dog epithelium (canis spp) -    8 Cat hair (vasquez catus) -    9 Cockroach   (Blatella americana & germanica) -    10 Grass mix midwest   (Lilibeth, Orchard, Redtop, Berto) -    11 Martín grass (sorghum halepense) -    12 Weed mix   (common Cocklebur, Lamb s quarters, rough redroot Pigweed, Dock/Sorrel) -    13 Mug wort (artemisia vulgare) -    14 Ragweed giant/short (ambrosia spp) -    15 English Plantain (plantago lanceolata) -    16 Tree mix 1 (Pecan, Maple BHR, Oak RVW, american Farnham, black Range) -    17 Red cedar (juniperus virginia) -    18 Tree mix 2   (white Yoni, river/red Birch, black  Lakeland, common [place], american Elm) -    19 Box elder/Maple mix (acer spp) -    20 Morton shagbark (carya ovata) -             Conclusion: no specific reaction in prick tests      Intradermal Testing (Placed 10/13/20 )    No Substance Conc.  Readings (15min) Evaluation   - NaCl  0.9% -    + Histamine (prick) 0.1mg / ml ++    DF Standard Dust Mite - D. Farinae 1:10 -    DP Standard Dust Mite - D. Pteronyssinus 1:10 -    A Aspergillus fumigatus  1:10 -    P Penicillium notatum 1:10 -    D dog 1:10 -    C cat 1:10 -      Conclusion: no immediate type reaction to any of the allergens tested. Patient will feed back if any delayed reaction      Impression/Plan:    # perennial Rhinosinusitis with postnasal drip = non-allergic/irritant and/or chronic infectious   DDx unlikely allergic    --> atopy screen prick test negative    --> intradermal tests negatives    ==> total IgE, spec IgE to dust mites, dog = all negatives!    # some seasonal aggravation with conjunctivitis (April) --> no sign for specific allergy    # dry hypersensitive skin with pityriasis alba and eczema on ear lobe = atopic dermatitis?      Patient counseling:  See above      Follow-up: in Derm-Allergy clinic if any delayed reaction or change of situation    I spent a total of 35 minutes face to face with Stacey Rod during today s office visit. Over 50% of this time was spent counseling the patient and/or coordinating care.  Please see Assessment and Plan for details.  This excludes any time spent performing prick and intradermal tests    Thank you for the opportunity be involved in the care of this patient.     Staff: Rupinder Hansen and  Betzy Andres LPN

## 2020-10-13 NOTE — NURSING NOTE
Allergy Rooming Note    Stacey Rod's goals for this visit include:   Chief Complaint   Patient presents with     Allergy Consult     Neva is here for an allergy consult relating to a chronic nasal drainage.      Betzy Andres LPN

## 2020-10-13 NOTE — PROGRESS NOTES
[facility] Dermato-allergology note      Allergy Problem List:    Specialty Problems        Allergy Diagnoses    Acne vulgaris        Seasonal allergic rhinitis              CC:   No chief complaint on file.        Encounter Date: Oct 13, 2020    History of Present Illness:  I have reviewed the existing informations with patient personally, in Epic and other sources including the nursing intake corresponding to this issue. Stacey Rod is a 19 year old female who presents to the consult  in person     Past Medical History:   Patient Active Problem List   Diagnosis     Acne vulgaris     Seasonal allergic rhinitis     No past medical history on file.    Allergy History:   No Known Allergies    Social History:  The patient {works:[id number]}. Patient has the following hobbies or non-occupational exposure ***     reports that she has quit smoking. She smoked 0.00 packs per day. She has never used smokeless tobacco.      Family History:  Family History   Problem Relation Age of Onset     Melanoma No family hx of      Skin Cancer No family hx of        Medications:  Current Outpatient Medications   Medication Sig Dispense Refill     clindamycin (CLEOCIN T) 1 % external lotion Apply topically daily 60 mL 4     drospirenone-ethinyl estradiol (JUAN J) 3-0.03 MG tablet        escitalopram (LEXAPRO) 20 MG tablet Take 20 mg by mouth daily       fluticasone (FLONASE) 50 MCG/ACT nasal spray Spray 2 sprays into both nostrils 2 times daily 16 g 11     levonorgestrel (EWA) 13.5 MG IUD 1 each by Intrauterine route once       misoprostol (CYTOTEC) 200 MCG tablet        omeprazole (PRILOSEC) 20 MG DR capsule        tretinoin (RETIN-A) 0.05 % external cream Use nightly for acne as tolerated 45 g 5     triamcinolone (KENALOG) 0.1 % external ointment Apply topically 2 times daily 80 g 5     valACYclovir (VALTREX) 500 MG tablet              Review of Systems:  -As per HPI  -Constitutional: The patient denies fatigue,  fevers, chills, unintended weight loss, and night sweats.  -HEENT: Patient denies nonhealing oral sores.  -Skin: As above in HPI. No additional skin concerns.    Physical exam:  Vitals: There were no vitals taken for this visit.  GEN: {RFGeneralAppearance:[id number]}   Skin: Focused examination of the *** within the consultation was performed.   As above in HPI. No additional skin concerns.  - upper respiratory tract: currently no obvious Rhinitis  - lungs: no signs for active and present Asthma/Wheezing or coughing  - eyes: currently no active conjunctivits  - GI system/digestion: currently no problems, no bloating or Diarrhoea      Allergy Tests:    Past Allergy Test    Future Allergy Test: 10/13/2020     Order for PATCH TESTS    [] Outpatient  [] Inpatient: [facility]..../ Bed ....      Skin Atopy (atopic dermatitis) [] Yes   [] No  Rhinitis/Sinusitis:   [] Yes   [] No  Allergic Asthma:   [] Yes   [] No  Food Allergy:   [] Yes   [] No  Leg ulcers:   [] Yes   [] No  Hand eczema:   [] Yes   [] No   Leading hand:   [] R   [] L       [] Ambidextrous                        Reason for tests (suspected allergy): ***  Known previous allergies: ***    Standardized panels  [] Standard panel (40 tests)  [] Preservatives & Antimicrobials (31 tests)  [] Emulsifiers & Additives (25 tests)   [] Perfumes/Flavours & Plants (25 tests)  [] Hairdresser panel (12 tests)  [] Rubber Chemicals (22 tests)  [] Plastics (26 tests)  [] Colorants/Dyes/Food additives (20 tests)  [] Metals (implants/dental) (24 tests)  [] Local anaesthetics/NSAIDs (13 tests)  [] Antibiotics & Antimycotics (14 tests)   [] Corticosteroids (15 tests)   [] Photopatch test (62 tests)   [] others: ...      [] Patient's own products: ...    DO NOT test if chemical or biological identity is unknown!     always ask from patient the product information and safety sheets (MSDS)   [] Atopy screen prick test (Atopic predisposition): ...    [] Patient needs consultation with Allergy  team (changes of tests may apply)  [] Tests discussed with Allergy team (can have direct appointment for patch tests)     Order for PRICK TESTS    [] Outpatient  [] Inpatient: [facility]..../ Bed ....      Skin Atopy (atopic dermatitis) [] Yes   [] No  Contact allergies:   [] Yes   [] No  Urticaria/Angioedema  [] Yes   [] No  Rhinitis/Sinusitis:   [] Yes   [] No   [] seasonal [] perennial            Allergic Asthma:   [] Yes   [] No  Pets :  [] Yes   [] No  which?......  Food Allergy:   [] Yes   [] No  Drug allergies:   [] Yes   [] No      Reason for tests (suspected allergy): ***  Known previous allergies: ***    Standardized prick panels  [] Atopic panel (20 tests)  [] Pediatric Panel (12 tests)  [] Milk, Meat, Eggs, Grains (20 tests)   [] Dust, Epithelia, Feathers (10 tests)  [] Fish, Seafood, Shellfish (17 tests)  [] Nuts, Beans (14 tests)  [] Spice, Vegetable, Fruit (17 tests)  [] Others: ...      [] Patient's own products: ...    DO NOT test if chemical or biological identity is unknown!     always ask from patient the product information and safety sheets (MSDS)     Standardized intradermal tests  [] Penicillium notatum [] Aspergillus fumigatus [] House dust mites  [] Bee venom   [] Wasp venom !!Specific protocol with dilutions!!  [] Others: ...    [] Patient needs consultation with Allergy team (changes of tests may apply)  [] Tests discussed with Allergy team (can have direct appointment for allergy tests)     Impression/Plan:    No diagnosis found.      Patient counseling:  ***      Follow-up: in Derm-Allergy clinic ***    I spent a total of [5:10:15:20:25:30:35:40:45:50:55:60:***] minutes face to face with Stacey Rod during today s office visit. Over 50% of this time was spent counseling the patient and/or coordinating care.  Please see Assessment and Plan for details.  This excludes any time spent performing ____________ (ex:  bx, applying patch tests, cryo therapy)      Thank you for the opportunity be  involved in the care of this patient.     Staff:  *** Betzy Andres LPN

## 2020-10-13 NOTE — LETTER
10/13/2020         RE: Stacey Rod  [address]  [place] [address]        Dear Colleague,    Thank you for referring your patient, Stacey Rod, to the [facility] [place]. Please see a copy of my visit note below.    [facility] Dermato-allergology note      Allergy Problem List:    Specialty Problems        Allergy Diagnoses    Acne vulgaris        Seasonal allergic rhinitis              CC:   No chief complaint on file.        Encounter Date: Oct 13, 2020    History of Present Illness:  I have reviewed the existing informations with patient personally, in Epic and other sources including the nursing intake corresponding to this issue. Stacey Rod is a 19 year old female who presents to the consult  in person     Patient has all the time runny nose, sneezing, postnasal drip, recurrent sinusitis, dry/red eyes.   In addition recurrent coughing to clear throat. No Asthma, not even exercise induced.    Some aggravation in Spring, but mostly entire year.  Mostly in the morning, more mucus     As child prick tests on back, but all negative    No problems with pets    Sometimes problems with stomach acidity (heart burn).     Uses Flonase once a while    Past Medical History:   Patient Active Problem List   Diagnosis     Acne vulgaris     Seasonal allergic rhinitis     No past medical history on file.    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 Patient has the following hobbies or non-occupational exposure ([place]ate, Rockclimbing)     reports that she has quit smoking. She smoked 0.00 packs per day. She has never used smokeless tobacco.      Family History:  Family History   Problem Relation Age of Onset     Melanoma No family hx of      Skin Cancer No family hx of        Medications:  Current Outpatient Medications   Medication Sig Dispense Refill      clindamycin (CLEOCIN T) 1 % external lotion Apply topically daily 60 mL 4     drospirenone-ethinyl estradiol (JUAN J) 3-0.03 MG tablet        escitalopram (LEXAPRO) 20 MG tablet Take 20 mg by mouth daily       fluticasone (FLONASE) 50 MCG/ACT nasal spray Spray 2 sprays into both nostrils 2 times daily 16 g 11     levonorgestrel (EWA) 13.5 MG IUD 1 each by Intrauterine route once       misoprostol (CYTOTEC) 200 MCG tablet        omeprazole (PRILOSEC) 20 MG DR capsule        tretinoin (RETIN-A) 0.05 % external cream Use nightly for acne as tolerated 45 g 5     triamcinolone (KENALOG) 0.1 % external ointment Apply topically 2 times daily 80 g 5     valACYclovir (VALTREX) 500 MG tablet              Review of Systems:  -As per HPI  -Constitutional: The patient denies fatigue, fevers, chills, unintended weight loss, and night sweats.  -HEENT: Patient denies nonhealing oral sores.  -Skin: As above in HPI. No additional skin concerns.    Physical exam:  Vitals: There were no vitals taken for this visit.  GEN: This is a well developed, well-nourished female in no acute distress, in a pleasant mood.    Skin: Focused examination of the test sites on arms within the consultation was performed.   See test results below  As above in HPI. No additional skin concerns.  - upper respiratory tract: currently no obvious Rhinitis  - lungs: no signs for active and present Asthma/Wheezing or coughing  - eyes: currently no active conjunctivits  - GI system/digestion: currently no problems, no bloating or Diarrhoea    Allergy Tests:    Past Allergy Test    Future Allergy Test: 10/13/2020     Order for PRICK TESTS    [x] Outpatient  [] Inpatient: [facility]..../ Bed ....      Skin Atopy (atopic dermatitis) [x] Yes   [] No pityriasis alba on arms, dermatitis on ear lobes  Contact allergies:   [x] Yes   [] No  Urticaria/Angioedema  [] Yes   [x] No  Rhinitis/Sinusitis:   [x] Yes   [] No   [] seasonal [x] perennial           Had in 2018 in Spring  (April) for 1 month conjunctivitis both eyes  Allergic Asthma:   [] Yes   [x] No  Pets :  [x] Yes   [] No  Which? 2 dogs (retriever and mutt) and 1 cat  Food Allergy:   [] Yes   [x] No  Drug allergies:    [] Yes   [x] No      Reason for tests (suspected allergy): perennial Rhinosinusitis with postnasal drip and some seasonal conjunctivitis (spring)  Known previous allergies: negatives    Standardized prick panels  [x] Atopic panel (20 tests)  [] Pediatric Panel (12 tests)  [] Milk, Meat, Eggs, Grains (20 tests)   [] Dust, Epithelia, Feathers (10 tests)  [] Fish, Seafood, Shellfish (17 tests)  [] Nuts, Beans (14 tests)  [] Spice, Vegetable, Fruit (17 tests)  [] Others: ...      [] Patient's own products: ...    DO NOT test if chemical or biological identity is unknown!     always ask from patient the product information and safety sheets (MSDS)     Standardized intradermal tests  [x] Penicillium notatum [x] Aspergillus fumigatus [x] House dust mites  [] Bee venom   [] Wasp venom !!Specific protocol with dilutions!!  [] Others: ...    [] Patient needs consultation with Allergy team (changes of tests may apply)  [] Tests discussed with Allergy team (can have direct appointment for allergy tests)     Atopy Screen (Placed 10/13/20 )    No Substance Readings (15 min) Evaluation   POS Histamine 1mg/ml ++    NEG NaCl 0.9% - Strong dermographism     No Substance Readings (15 min) Evaluation   1 Alternaria alternata (tenuis)  -    2 Cladosporium herbarum -    3 Aspergillus fumigatus -    4 Penicillium notatum -    5 Dermatophagoides pteronyssinus -    6 Dermatophagoides farinae -    7 Dog epithelium (canis spp) -    8 Cat hair (vasquez catus) -    9 Cockroach   (Blatella americana & germanica) -    10 Grass mix midwest   (Lilibeth, Orchard, Redtop, Berto) -    11 Martín grass (sorghum halepense) -    12 Weed mix   (common Cocklebur, Lamb s quarters, rough redroot Pigweed, Dock/Sorrel) -    13 Mug wort (artemisia vulgare) -    14  Ragweed giant/short (ambrosia spp) -    15 English Plantain (plantago lanceolata) -    16 Tree mix 1 (Pecan, Maple BHR, Oak RVW, american Mauldin, black [place]) -    17 Red cedar (juniperus virginia) -    18 Tree mix 2   (white Yoni, river/red Birch, black [place], common [place], american Elm) -    19 Box elder/Maple mix (acer spp) -    20 [place] shagbark (carya ovata) -             Conclusion: no specific reaction in prick tests      Intradermal Testing (Placed 10/13/20 )    No Substance Conc.  Readings (15min) Evaluation   - NaCl  0.9% -    + Histamine (prick) 0.1mg / ml ++    DF Standard Dust Mite - D. Farinae 1:10 -    DP Standard Dust Mite - D. Pteronyssinus 1:10 -    A Aspergillus fumigatus  1:10 -    P Penicillium notatum 1:10 -    D dog 1:10 -    C cat 1:10 -      Conclusion: no immediate type reaction to any of the allergens tested. Patient will feed back if any delayed reaction      Impression/Plan:    # perennial Rhinosinusitis with postnasal drip = probably non-allergic/irritant and/or chronic infectious   DDx less likely allergy    --> atopy screen prick test negative    --> intradermal tests negatives    ==> total IgE, spec IgE to dust mites, dog    # some seasonal aggravation with conjunctivitis (April) --> no sign for specific allergy    # dry hypersensitive skin with pityriasis alba and eczema on ear lobe = atopic dermatitis?      Patient counseling:  See above      Follow-up: in Derm-Allergy clinic if any delayed reaction or change of situation    I spent a total of 35 minutes face to face with Stacey Rod during today s office visit. Over 50% of this time was spent counseling the patient and/or coordinating care.  Please see Assessment and Plan for details.  This excludes any time spent performing prick and intradermal tests    Thank you for the opportunity be involved in the care of this patient.     Staff: Rupinder Hansen and  Betzy Andres LPN                  Again, thank you for  allowing me to participate in the care of your patient.        Sincerely,        John Vences MD

## 2020-10-15 LAB
D FARINAE IGE QN: <0.1 KU(A)/L
D PTERONYSS IGE QN: <0.1 KU(A)/L
DOG DANDER+EPITH IGE QN: <0.1 KU(A)/L
IGE SERPL-ACNC: 18 KIU/L (ref 0–114)

## 2021-01-03 ENCOUNTER — HEALTH MAINTENANCE LETTER (OUTPATIENT)
Age: 21
End: 2021-01-03

## 2021-10-10 ENCOUNTER — HEALTH MAINTENANCE LETTER (OUTPATIENT)
Age: 21
End: 2021-10-10

## 2021-10-25 ENCOUNTER — MYC MEDICAL ADVICE (OUTPATIENT)
Dept: LAB | Facility: CLINIC | Age: 21
End: 2021-10-25

## 2021-10-27 NOTE — TELEPHONE ENCOUNTER
Called pt and informed her BP and BMI are not done as lab appointments.  Advised scheduling an appointment through [facility] for a physical/establish care.    Pt not a pt here so would need orders and apt for labs also. Pt says she needs lipid results by Friday.    Did know lipids take a couple pf days to get back and advised apt.    Shania Hinson RN

## 2022-01-29 ENCOUNTER — HEALTH MAINTENANCE LETTER (OUTPATIENT)
Age: 22
End: 2022-01-29

## 2022-09-18 ENCOUNTER — HEALTH MAINTENANCE LETTER (OUTPATIENT)
Age: 22
End: 2022-09-18

## 2023-05-06 ENCOUNTER — HEALTH MAINTENANCE LETTER (OUTPATIENT)
Age: 23
End: 2023-05-06

## 2025-03-09 ENCOUNTER — HEALTH MAINTENANCE LETTER (OUTPATIENT)
Age: 25
End: 2025-03-09